# Patient Record
Sex: FEMALE | Race: OTHER | Employment: UNEMPLOYED | URBAN - METROPOLITAN AREA
[De-identification: names, ages, dates, MRNs, and addresses within clinical notes are randomized per-mention and may not be internally consistent; named-entity substitution may affect disease eponyms.]

---

## 2020-11-11 ENCOUNTER — OFFICE VISIT (OUTPATIENT)
Dept: OBGYN CLINIC | Facility: CLINIC | Age: 25
End: 2020-11-11
Payer: MEDICAID

## 2020-11-11 DIAGNOSIS — O99.280 HYPOTHYROID IN PREGNANCY, ANTEPARTUM: ICD-10-CM

## 2020-11-11 DIAGNOSIS — E03.9 HYPOTHYROID IN PREGNANCY, ANTEPARTUM: ICD-10-CM

## 2020-11-11 DIAGNOSIS — Z71.89 PRENATAL CONSULT: Primary | ICD-10-CM

## 2020-11-11 PROBLEM — E03.8 OTHER SPECIFIED HYPOTHYROIDISM: Status: ACTIVE | Noted: 2020-11-11

## 2020-11-16 ENCOUNTER — APPOINTMENT (OUTPATIENT)
Dept: ULTRASOUND IMAGING | Facility: HOSPITAL | Age: 25
End: 2020-11-16
Attending: EMERGENCY MEDICINE
Payer: MEDICAID

## 2020-11-16 ENCOUNTER — APPOINTMENT (OUTPATIENT)
Dept: MRI IMAGING | Facility: HOSPITAL | Age: 25
End: 2020-11-16
Attending: EMERGENCY MEDICINE
Payer: MEDICAID

## 2020-11-16 ENCOUNTER — HOSPITAL ENCOUNTER (EMERGENCY)
Facility: HOSPITAL | Age: 25
Discharge: HOME | End: 2020-11-16
Attending: EMERGENCY MEDICINE
Payer: MEDICAID

## 2020-11-16 ENCOUNTER — HOSPITAL ENCOUNTER (OUTPATIENT)
Facility: HOSPITAL | Age: 25
Setting detail: OBSERVATION
Discharge: HOME | End: 2020-11-16
Attending: OBSTETRICS & GYNECOLOGY | Admitting: OBSTETRICS & GYNECOLOGY
Payer: MEDICAID

## 2020-11-16 VITALS
DIASTOLIC BLOOD PRESSURE: 64 MMHG | SYSTOLIC BLOOD PRESSURE: 102 MMHG | OXYGEN SATURATION: 98 % | TEMPERATURE: 99 F | RESPIRATION RATE: 18 BRPM | HEART RATE: 104 BPM | WEIGHT: 123 LBS

## 2020-11-16 VITALS
RESPIRATION RATE: 18 BRPM | HEART RATE: 121 BPM | TEMPERATURE: 99 F | DIASTOLIC BLOOD PRESSURE: 76 MMHG | SYSTOLIC BLOOD PRESSURE: 118 MMHG

## 2020-11-16 DIAGNOSIS — N28.9 KIDNEY LESION, NATIVE, RIGHT: ICD-10-CM

## 2020-11-16 DIAGNOSIS — R10.9 RIGHT FLANK PAIN: Primary | ICD-10-CM

## 2020-11-16 DIAGNOSIS — M51.24 BULGING OF THORACIC INTERVERTEBRAL DISC: ICD-10-CM

## 2020-11-16 PROBLEM — Z34.90 PREGNANCY: Status: ACTIVE | Noted: 2020-11-16

## 2020-11-16 PROBLEM — Z34.90 PREGNANCY (HCC): Status: ACTIVE | Noted: 2020-11-16

## 2020-11-16 PROCEDURE — 72195 MRI PELVIS W/O DYE: CPT | Performed by: EMERGENCY MEDICINE

## 2020-11-16 PROCEDURE — 36415 COLL VENOUS BLD VENIPUNCTURE: CPT

## 2020-11-16 PROCEDURE — 83690 ASSAY OF LIPASE: CPT | Performed by: EMERGENCY MEDICINE

## 2020-11-16 PROCEDURE — 80048 BASIC METABOLIC PNL TOTAL CA: CPT | Performed by: EMERGENCY MEDICINE

## 2020-11-16 PROCEDURE — 81001 URINALYSIS AUTO W/SCOPE: CPT | Performed by: EMERGENCY MEDICINE

## 2020-11-16 PROCEDURE — 76770 US EXAM ABDO BACK WALL COMP: CPT | Performed by: EMERGENCY MEDICINE

## 2020-11-16 PROCEDURE — 76705 ECHO EXAM OF ABDOMEN: CPT | Performed by: EMERGENCY MEDICINE

## 2020-11-16 PROCEDURE — 99213 OFFICE O/P EST LOW 20 MIN: CPT | Performed by: OBSTETRICS & GYNECOLOGY

## 2020-11-16 PROCEDURE — 85025 COMPLETE CBC W/AUTO DIFF WBC: CPT | Performed by: EMERGENCY MEDICINE

## 2020-11-16 PROCEDURE — 87086 URINE CULTURE/COLONY COUNT: CPT | Performed by: EMERGENCY MEDICINE

## 2020-11-16 PROCEDURE — 99285 EMERGENCY DEPT VISIT HI MDM: CPT

## 2020-11-16 PROCEDURE — 87186 SC STD MICRODIL/AGAR DIL: CPT | Performed by: EMERGENCY MEDICINE

## 2020-11-16 PROCEDURE — 87088 URINE BACTERIA CULTURE: CPT | Performed by: EMERGENCY MEDICINE

## 2020-11-16 PROCEDURE — 80076 HEPATIC FUNCTION PANEL: CPT | Performed by: EMERGENCY MEDICINE

## 2020-11-16 PROCEDURE — 81025 URINE PREGNANCY TEST: CPT

## 2020-11-16 RX ORDER — GLYCERIN/MINERAL OIL
LOTION (ML) TOPICAL
COMMUNITY

## 2020-11-16 RX ORDER — LEVOTHYROXINE SODIUM 0.03 MG/1
25 TABLET ORAL
COMMUNITY
End: 2020-12-09

## 2020-11-16 NOTE — ED NOTES
Pt states right-sided abd/flank pain since yesterday at 1430- no trauma. First pregnancy. No bleeding.

## 2020-11-16 NOTE — ED PROVIDER NOTES
Patient signed out to me pending MRI, no evidence of appendicitis or renal lesion as seen on ultrasound. Patient does have bulging disc at T11-12 which could explain her right flank pain. Will refer to physiatry.   Fetal heart tones normal no tenderness o

## 2020-11-16 NOTE — ED PROVIDER NOTES
Patient Seen in: [facility] AND [facility] Emergency Department      History   Patient presents with:  Abdomen/Flank Pain  Pregnancy Issues    Stated Complaint: Right sided flank pain     HPI    25 y/o female approximately 24 weeks pregnant now with her first pr pulses. Pulmonary/Chest: Effort normal. No respiratory distress. Abdominal: Soft. There is no tenderness. There is no guarding. No significant pain with palpation of the right flank or right upper quadrant.   No significant pain on palpation of the ri CBC W/ DIFFERENTIAL[[id number]]          Abnormal            Final result                 Please view results for these tests on the individual orders.    RAINBOW DRAW BLUE   RAINBOW DRAW LAVENDER   RAINBOW DRAW LIGHT GREEN   RAINBOW DRAW GOLD   URINE CULT collecting system but no juan hydronephrosis. Normal echogenicity. No shadowing stone. Of note, there is a questionable poorly defined mass versus artifact  in the upper pole of the right kidney which measures 2.8 x 2.8 x 1.9 cm.   Additional imaging is no discharge medications for this patient.

## 2020-11-17 NOTE — PROGRESS NOTES
OB Laborist On Call   Obstetrics and Gynecology    OB/GYN: Triage Progress Note       Subjective: Krishna Melendez is a 26year old  female at 18w0d Estimated Date of Delivery: 3/8/21 who is being seen in triage with complaint of right flank pain.  The There is no significant bilateral hydronephrosis. Ureters are not well seen, suggesting absence of hydroureter. ADRENALS:      No nodule or enlargement. AORTA/VASCULAR:      No aortic aneurysm. No periaortic inflammation.   RETROPERITONEUM:  No inf GALLBLADDER:            Normal size without evidence of intraluminal calculi, wall thickening, or pericholecystic fluid. According to the technologist performing the study, the sonographic Selvin Null sign was not elicited.   BILE DUCTS:    Negative for dilata excluded. See   above. \"     Labs:   Results for Anette Taylor (MRN [id number]) as of 11/16/2020 21:01   Ref.  Range 11/16/2020 11:20   Glucose Latest Ref Range: 70 - 99 mg/dL 80   Sodium Latest Ref Range: 136 - 145 mmol/L 139   Potassium Latest Ref Range Absolute Latest Ref Range: 0.10 - 1.00 x10(3) uL 0.65   Eosinophils Absolute Latest Ref Range: 0.00 - 0.70 x10(3) uL 0.02   Basophils Absolute Latest Ref Range: 0.00 - 0.20 x10(3) uL 0.03   Immature Granulocyte Absolute Latest Ref Range: 0.00 - 1.00 x10(3) evidence of kidney stone, or pyelonephritis. Pt understood and agreed. - Reviewed with patient that she may use tylenol as needed for pain but if her pain is not improving or she develops fevers or chills to come back for evaluation. Pt understood.    - P

## 2020-11-17 NOTE — TRIAGE
[facility]      Triage Note    Marlee Johns Patient Status:  Observation    10/22/1995 MRN [id number]   Location [address]  Attending Griselda Andrews MD   Hosp Day # 0 PCP PHYSICIAN NONSTAFF        Para: Additional Comments       Patient presents with:   Complication: pt c/o constant right sided upper abdomen/flank pain.   Pt denied S/S of current UTI but states she had UTI 1.5 months ago and has been taking the antibiotics on and off as nee

## 2020-11-19 ENCOUNTER — NURSE ONLY (OUTPATIENT)
Dept: OBGYN CLINIC | Facility: CLINIC | Age: 25
End: 2020-11-19
Payer: MEDICAID

## 2020-11-19 VITALS — WEIGHT: 123 LBS | BODY MASS INDEX: 24 KG/M2

## 2020-11-19 RX ORDER — CEPHALEXIN 500 MG/1
CAPSULE ORAL
COMMUNITY
Start: 2020-10-26 | End: 2020-11-25 | Stop reason: ALTCHOICE

## 2020-11-19 NOTE — PROGRESS NOTES
Nurse education complete via phone visit. Pt instructed to review folder & handouts. Pt hx anxiety & hypothyroid. Pt is DENYS, records reviewed, lab results entered & verified. Last pap 10/26 & normal. Pt is being seen by a therapist for anxiety.  Was in ED f

## 2020-11-25 ENCOUNTER — OFFICE VISIT (OUTPATIENT)
Dept: NEUROLOGY | Facility: CLINIC | Age: 25
End: 2020-11-25
Payer: MEDICAID

## 2020-11-25 ENCOUNTER — INITIAL PRENATAL (OUTPATIENT)
Dept: OBGYN CLINIC | Facility: CLINIC | Age: 25
End: 2020-11-25
Payer: MEDICAID

## 2020-11-25 VITALS
BODY MASS INDEX: 25 KG/M2 | WEIGHT: 127 LBS | HEART RATE: 106 BPM | DIASTOLIC BLOOD PRESSURE: 73 MMHG | SYSTOLIC BLOOD PRESSURE: 108 MMHG

## 2020-11-25 VITALS — WEIGHT: 125 LBS | BODY MASS INDEX: 24.54 KG/M2 | HEIGHT: 60 IN

## 2020-11-25 DIAGNOSIS — Z34.90 PREGNANCY, UNSPECIFIED GESTATIONAL AGE: ICD-10-CM

## 2020-11-25 DIAGNOSIS — M54.16 ACUTE RIGHT LUMBAR RADICULOPATHY: Primary | ICD-10-CM

## 2020-11-25 DIAGNOSIS — Z34.02 ENCOUNTER FOR SUPERVISION OF NORMAL FIRST PREGNANCY IN SECOND TRIMESTER: Primary | ICD-10-CM

## 2020-11-25 PROBLEM — N39.0 UTI (URINARY TRACT INFECTION): Status: ACTIVE | Noted: 2020-11-25

## 2020-11-25 PROCEDURE — 0502F SUBSEQUENT PRENATAL CARE: CPT | Performed by: ADVANCED PRACTICE MIDWIFE

## 2020-11-25 PROCEDURE — 3074F SYST BP LT 130 MM HG: CPT | Performed by: ADVANCED PRACTICE MIDWIFE

## 2020-11-25 PROCEDURE — 3008F BODY MASS INDEX DOCD: CPT | Performed by: PHYSICAL MEDICINE & REHABILITATION

## 2020-11-25 PROCEDURE — 3078F DIAST BP <80 MM HG: CPT | Performed by: ADVANCED PRACTICE MIDWIFE

## 2020-11-25 PROCEDURE — 1111F DSCHRG MED/CURRENT MED MERGE: CPT | Performed by: PHYSICAL MEDICINE & REHABILITATION

## 2020-11-25 PROCEDURE — 81002 URINALYSIS NONAUTO W/O SCOPE: CPT | Performed by: ADVANCED PRACTICE MIDWIFE

## 2020-11-25 PROCEDURE — 99204 OFFICE O/P NEW MOD 45 MIN: CPT | Performed by: PHYSICAL MEDICINE & REHABILITATION

## 2020-11-25 RX ORDER — CEPHALEXIN 500 MG/1
500 CAPSULE ORAL 2 TIMES DAILY
Qty: 20 CAPSULE | Refills: 0 | Status: SHIPPED
Start: 2020-11-25 | End: 2020-12-05

## 2020-11-25 NOTE — PROGRESS NOTES
Pt was seen in ER and OB triage on 11/16. UTO e-coli pt stopped antibiotics because she thought they were no lnoger needed. Will, restart abx for 10 day course  Will send urine for culture today. Active fetus  No signs signs of PTL.   Reviewed S&S of PTL Co

## 2020-11-25 NOTE — PROGRESS NOTES
[address]  NEW PATIENT EVALUATION    Chief Complaint: flank pain. HISTORY OF PRESENT ILLNESS:   Patient presents with:  Back Pain: New right handed patient.  Patient went to the ER after having right side PAST SURGICAL HISTORY:   History reviewed. No pertinent surgical history.       CURRENT MEDICATIONS:     Current Outpatient Medications   Medication Sig Dispense Refill   • Prenatal Vit-Fe Fumarate-FA ( PRENATAL VITAMINS) 28-0.8 MG Oral Tab Take b intact. Ears: No auricular hematoma or deformities  Mouth: No lesions or ulcerations  Heart: peripheral pulses intact. Normal capillary refill.    Lungs: Non-labored respirations  Abdomen: No abdominal guarding  Extremities: No lower extremity edema bilat 11/16/2020     No results found for: PTP, PT, INR  No results found for: VITD, QVITD, VITD25, NEEW89CI    IMAGING:     MRI APPENDIX dated November 16, 2020 revealed:   CONCLUSION:   1. Normal retrocecal appendix.   2.  Right paracentral disc protrusion at addressed and there were no barriers to learning. Ángel BERNAL. [address] [facility]  Physical Medicine and Rehabilitation/Sports Medicine

## 2020-11-30 ENCOUNTER — OFFICE VISIT (OUTPATIENT)
Dept: ENDOCRINOLOGY CLINIC | Facility: CLINIC | Age: 25
End: 2020-11-30
Payer: MEDICAID

## 2020-11-30 ENCOUNTER — LAB ENCOUNTER (OUTPATIENT)
Dept: LAB | Age: 25
End: 2020-11-30
Attending: INTERNAL MEDICINE
Payer: MEDICAID

## 2020-11-30 VITALS — HEART RATE: 97 BPM | SYSTOLIC BLOOD PRESSURE: 123 MMHG | DIASTOLIC BLOOD PRESSURE: 72 MMHG

## 2020-11-30 DIAGNOSIS — E03.9 HYPOTHYROIDISM, UNSPECIFIED TYPE: Primary | ICD-10-CM

## 2020-11-30 DIAGNOSIS — E03.9 HYPOTHYROIDISM, UNSPECIFIED TYPE: ICD-10-CM

## 2020-11-30 PROCEDURE — 86800 THYROGLOBULIN ANTIBODY: CPT

## 2020-11-30 PROCEDURE — 3078F DIAST BP <80 MM HG: CPT | Performed by: INTERNAL MEDICINE

## 2020-11-30 PROCEDURE — 84439 ASSAY OF FREE THYROXINE: CPT

## 2020-11-30 PROCEDURE — 99243 OFF/OP CNSLTJ NEW/EST LOW 30: CPT | Performed by: INTERNAL MEDICINE

## 2020-11-30 PROCEDURE — 3074F SYST BP LT 130 MM HG: CPT | Performed by: INTERNAL MEDICINE

## 2020-11-30 PROCEDURE — 84443 ASSAY THYROID STIM HORMONE: CPT

## 2020-11-30 PROCEDURE — 86376 MICROSOMAL ANTIBODY EACH: CPT

## 2020-11-30 PROCEDURE — 36415 COLL VENOUS BLD VENIPUNCTURE: CPT

## 2020-11-30 NOTE — H&P
Name: Terese Vasquez  Date: 11/30/2020    Referring Physician:  Javan Richard    Patient presents with:  Consult: thyroid -25mcg levothyroxine; no symptoms now; 26 weeks pregnant      HISTORY OF PRESENT ILLNESS   Terese Vasquez is a 26 year o chest pain or palpitations  Gastrointestinal:  no abdominal pain, bowel movement problems  Musculoskeletal: no muscle pain or arthralgia  /Gyne: no frequency or discomfort while urinating  Psychiatric:  no acute distress, anxiety  or depression  Skin: no development and well being of the fetus. Maternal complications such as miscarriages, anaemia in pregnancy, pre-eclampsia, abruptio placenta and postpartum haemorrhage can occur in pregnant women with overt hypothyroidism.  Also, the offspring of these mot

## 2020-12-01 ENCOUNTER — TELEPHONE (OUTPATIENT)
Dept: ENDOCRINOLOGY CLINIC | Facility: CLINIC | Age: 25
End: 2020-12-01

## 2020-12-01 NOTE — TELEPHONE ENCOUNTER
Hi!  Please call this patient and let her know that I have reviewed her test results and that the cause for her hypothyroidism is Hashimoto thyroiditis, or Autoimmune thyroid disease.      Also, her TSH and free T4 are normal, so I would like her to stay on

## 2020-12-07 ENCOUNTER — LAB ENCOUNTER (OUTPATIENT)
Dept: LAB | Age: 25
End: 2020-12-07
Attending: ADVANCED PRACTICE MIDWIFE
Payer: MEDICAID

## 2020-12-07 DIAGNOSIS — Z34.02 ENCOUNTER FOR SUPERVISION OF NORMAL FIRST PREGNANCY IN SECOND TRIMESTER: ICD-10-CM

## 2020-12-07 PROCEDURE — 36415 COLL VENOUS BLD VENIPUNCTURE: CPT

## 2020-12-07 PROCEDURE — 86780 TREPONEMA PALLIDUM: CPT

## 2020-12-07 PROCEDURE — 87389 HIV-1 AG W/HIV-1&-2 AB AG IA: CPT

## 2020-12-07 PROCEDURE — 82950 GLUCOSE TEST: CPT

## 2020-12-07 PROCEDURE — 85027 COMPLETE CBC AUTOMATED: CPT

## 2020-12-08 ENCOUNTER — PATIENT MESSAGE (OUTPATIENT)
Dept: ENDOCRINOLOGY CLINIC | Facility: CLINIC | Age: 25
End: 2020-12-08

## 2020-12-08 ENCOUNTER — TELEPHONE (OUTPATIENT)
Dept: OBGYN CLINIC | Facility: CLINIC | Age: 25
End: 2020-12-08

## 2020-12-08 NOTE — TELEPHONE ENCOUNTER
----- Message from Trang oMrrow CNM sent at 12/7/2020  8:11 PM CST -----  Please call HIV negative all other PN labs are wnl for pregnancy

## 2020-12-09 RX ORDER — LEVOTHYROXINE SODIUM 0.03 MG/1
25 TABLET ORAL
Qty: 30 TABLET | Refills: 4 | Status: SHIPPED | OUTPATIENT
Start: 2020-12-09 | End: 2021-04-14

## 2020-12-09 NOTE — TELEPHONE ENCOUNTER
Dr. Venu Sapp reviewed labs from 12/1 and sent patient note \"I have reviewed your test results. Your Anti-TPO antibodies are positives, which means that the cause for your underactive thyroid is Hashimoto's Thyroiditis.  Your TSH and your Free T4 levels a

## 2020-12-09 NOTE — TELEPHONE ENCOUNTER
From: Kylie Blount  To: Joy Jackson MD  Sent: 12/8/2020 1:19 PM CST  Subject: Referral Request    Good afternoon Dr. Fabio Agosto,  This is Lucendia Hurt came in last Monday. I am currently out of my medication Levothyroxine 0.025MG.  I am

## 2020-12-22 ENCOUNTER — OFFICE VISIT (OUTPATIENT)
Dept: NEUROLOGY | Facility: CLINIC | Age: 25
End: 2020-12-22
Payer: MEDICAID

## 2020-12-22 ENCOUNTER — TELEPHONE (OUTPATIENT)
Dept: ENDOCRINOLOGY CLINIC | Facility: CLINIC | Age: 25
End: 2020-12-22

## 2020-12-22 ENCOUNTER — TELEPHONE (OUTPATIENT)
Dept: NEUROLOGY | Facility: CLINIC | Age: 25
End: 2020-12-22

## 2020-12-22 VITALS — BODY MASS INDEX: 25.32 KG/M2 | WEIGHT: 129 LBS | HEIGHT: 60 IN

## 2020-12-22 DIAGNOSIS — M79.10 MYALGIA: ICD-10-CM

## 2020-12-22 DIAGNOSIS — Z34.90 PREGNANCY, UNSPECIFIED GESTATIONAL AGE: ICD-10-CM

## 2020-12-22 DIAGNOSIS — M54.16 ACUTE RIGHT LUMBAR RADICULOPATHY: ICD-10-CM

## 2020-12-22 DIAGNOSIS — M53.3 SACROILIAC JOINT DYSFUNCTION OF RIGHT SIDE: Primary | ICD-10-CM

## 2020-12-22 PROCEDURE — 3008F BODY MASS INDEX DOCD: CPT | Performed by: PHYSICAL MEDICINE & REHABILITATION

## 2020-12-22 PROCEDURE — 99214 OFFICE O/P EST MOD 30 MIN: CPT | Performed by: PHYSICAL MEDICINE & REHABILITATION

## 2020-12-22 NOTE — PATIENT INSTRUCTIONS
-Please wear a SI belt  -My office will call when injection is approved  -Start PT and home exercises  -Ice/Heat as tolerated

## 2020-12-22 NOTE — TELEPHONE ENCOUNTER
Colleen Rojas at [facility]- Case/Reference # [id number] to initiate authorization for Ultrasound guided Right SI joint injection with corticosteroid CPT [id number] dx:M53.3+M79.10 to be done in office. (requested 3 visits over 6 months)

## 2020-12-22 NOTE — TELEPHONE ENCOUNTER
Please refer to TE 12/1/20. Spoke with Do Brown MD message. Scheduled appointment on 2/3/21, and will have thyroid labs rechecked before her appointment. No further questions at this time. Lab already ordered.

## 2020-12-23 PROBLEM — M53.3 SACROILIAC JOINT DYSFUNCTION OF RIGHT SIDE: Status: ACTIVE | Noted: 2020-12-23

## 2020-12-23 NOTE — PROGRESS NOTES
[address]  FOLLOW UP EVALUATION    Chief Complaint: flank pain.     HISTORY OF PRESENT ILLNESS:   Patient presents with:  Low Back Pain: LOV: 11/25/2020 pain started two months ago and went to the ER. pain i She denies any saddle anesthesia. Her pain is intermittently present. She cannot identify any specific exacerbating factors as she thought she had some back pain related to her pregnancy.   She occasionally gets pain in the groins but she thinks this is PHYSICAL EXAM:   Ht 60\"   Wt 129 lb (58.5 kg)   LMP 06/01/2020 (Exact Date)   BMI 25.19 kg/m²   General: No immediate distress  Head: Normocephalic/ Atraumatic  Eyes: Extra-occular movements intact.    Ears: No auricular hematoma or deformities  Mout 11/16/2020    ALKPHO 76 11/16/2020    AST 16 11/16/2020    ALT 15 11/16/2020    BILT 0.3 11/16/2020    TP 7.3 11/16/2020    ALB 2.8 (L) 11/16/2020     11/16/2020    K 3.8 11/16/2020     11/16/2020    CO2 27.0 11/16/2020     No results found for

## 2020-12-29 NOTE — TELEPHONE ENCOUNTER
[address] to check on status of Ultrasound guided Right SI joint injection with corticosteroid. Case/Reference # [id number]. T/t [place] [employer] Authorization # [id number] for 3 units/DOS effective 12/22/20 to 06/22/21.  Will inform Nursi

## 2021-01-04 ENCOUNTER — ROUTINE PRENATAL (OUTPATIENT)
Dept: OBGYN CLINIC | Facility: CLINIC | Age: 26
End: 2021-01-04
Payer: MEDICAID

## 2021-01-04 VITALS
SYSTOLIC BLOOD PRESSURE: 107 MMHG | BODY MASS INDEX: 26.31 KG/M2 | WEIGHT: 134 LBS | HEIGHT: 60 IN | HEART RATE: 114 BPM | DIASTOLIC BLOOD PRESSURE: 73 MMHG

## 2021-01-04 DIAGNOSIS — F41.9 ANXIETY: ICD-10-CM

## 2021-01-04 DIAGNOSIS — Z34.03 ENCOUNTER FOR SUPERVISION OF NORMAL FIRST PREGNANCY IN THIRD TRIMESTER: Primary | ICD-10-CM

## 2021-01-04 LAB
MULTISTIX LOT#: 5077 NUMERIC
NITRITE, URINE: POSITIVE
PH, URINE: 6.5 (ref 4.5–8)
SPECIFIC GRAVITY: 1.02 (ref 1–1.03)
URINE-COLOR: YELLOW
UROBILINOGEN,SEMI-QN: 0.2 MG/DL (ref 0–1.9)

## 2021-01-04 PROCEDURE — 3008F BODY MASS INDEX DOCD: CPT | Performed by: ADVANCED PRACTICE MIDWIFE

## 2021-01-04 PROCEDURE — 3074F SYST BP LT 130 MM HG: CPT | Performed by: ADVANCED PRACTICE MIDWIFE

## 2021-01-04 PROCEDURE — 90471 IMMUNIZATION ADMIN: CPT | Performed by: ADVANCED PRACTICE MIDWIFE

## 2021-01-04 PROCEDURE — 90715 TDAP VACCINE 7 YRS/> IM: CPT | Performed by: ADVANCED PRACTICE MIDWIFE

## 2021-01-04 PROCEDURE — 3078F DIAST BP <80 MM HG: CPT | Performed by: ADVANCED PRACTICE MIDWIFE

## 2021-01-04 PROCEDURE — 81002 URINALYSIS NONAUTO W/O SCOPE: CPT | Performed by: ADVANCED PRACTICE MIDWIFE

## 2021-01-04 PROCEDURE — 0502F SUBSEQUENT PRENATAL CARE: CPT | Performed by: ADVANCED PRACTICE MIDWIFE

## 2021-01-04 NOTE — PROGRESS NOTES
Ms. Ginger Meade is feeling well overall, denies FVB, LOF, and any danger signs. Reports fetal movement appropriate for gestational age. Continues to have some right-sided back pain and some anterior pelvic pain.  Intermittent, due to weight/stress o

## 2021-01-05 LAB
C TRACH DNA SPEC QL NAA+PROBE: NEGATIVE
N GONORRHOEA DNA SPEC QL NAA+PROBE: NEGATIVE

## 2021-01-06 ENCOUNTER — OFFICE VISIT (OUTPATIENT)
Dept: PHYSICAL THERAPY | Age: 26
End: 2021-01-06
Attending: PHYSICAL MEDICINE & REHABILITATION
Payer: MEDICAID

## 2021-01-06 ENCOUNTER — TELEPHONE (OUTPATIENT)
Dept: OBGYN CLINIC | Facility: CLINIC | Age: 26
End: 2021-01-06

## 2021-01-06 DIAGNOSIS — M54.16 ACUTE RIGHT LUMBAR RADICULOPATHY: ICD-10-CM

## 2021-01-06 PROCEDURE — 97161 PT EVAL LOW COMPLEX 20 MIN: CPT | Performed by: PHYSICAL THERAPIST

## 2021-01-06 PROCEDURE — 97110 THERAPEUTIC EXERCISES: CPT | Performed by: PHYSICAL THERAPIST

## 2021-01-06 RX ORDER — NITROFURANTOIN 25; 75 MG/1; MG/1
100 CAPSULE ORAL 2 TIMES DAILY
Qty: 14 CAPSULE | Refills: 0 | Status: SHIPPED | OUTPATIENT
Start: 2021-01-06 | End: 2021-01-13

## 2021-01-06 NOTE — TELEPHONE ENCOUNTER
----- Message from Leonardo Contreras CNM sent at 1/6/2021 10:36 AM CST -----  Positive urine culture - rx sent to pharmacy.

## 2021-01-06 NOTE — PROGRESS NOTES
P.T. EVALUATION:   Referring Physician: Dr. Lanier Blood  Diagnosis: Acute right lumbar radiculopathy (M54.16)    Date of Onset: 11/25/2020 Date of Service: 1/6/2021     PATIENT SUMMARY   Patient verbalized consent for Physical Therapy treatment.   [employer] and tx due to back pain. Patient presents with the following limitations: decreased tolerance to prolonged sitting and standing. Difficulty bending, lifting, walking. Patient states she has limited tolerance to household activities.   Fabienne Botello would benefit f for 2 x/week or a total of 10 visits over a 90 day period and as patient's schedule allows.  Treatment will include: Modalities prn, manual therapy, therapeutic exercises, therapeutic activity, and instructions on a home program.    Education or treatment l

## 2021-01-08 ENCOUNTER — OFFICE VISIT (OUTPATIENT)
Dept: PHYSICAL THERAPY | Age: 26
End: 2021-01-08
Attending: PHYSICAL MEDICINE & REHABILITATION
Payer: MEDICAID

## 2021-01-08 PROCEDURE — 97110 THERAPEUTIC EXERCISES: CPT | Performed by: PHYSICAL THERAPIST

## 2021-01-08 NOTE — PROGRESS NOTES
Diagnosis: Acute right lumbar radiculopathy (M54.16)         Next MD visit: none scheduled  Fall Risk: standard         Precautions: n/a          Medication Changes since last visit?: No      Subjective: States her back was sore yesterday and felt better

## 2021-01-11 ENCOUNTER — OFFICE VISIT (OUTPATIENT)
Dept: PHYSICAL THERAPY | Age: 26
End: 2021-01-11
Attending: PHYSICAL MEDICINE & REHABILITATION
Payer: MEDICAID

## 2021-01-11 PROCEDURE — 97110 THERAPEUTIC EXERCISES: CPT | Performed by: PHYSICAL THERAPIST

## 2021-01-11 NOTE — PROGRESS NOTES
Diagnosis: Acute right lumbar radiculopathy (M54.16)         Next MD visit: none scheduled  Fall Risk: standard         Precautions: n/a          Medication Changes since last visit?: No      Subjective: States her back felt better after last therapy ses during activity. Plan: Continue progression of therapeutic exercises to decrease pain, improve trunk mobility, and tolerance to activities.     Charges: EX3       Total Timed Treatment: 40 min  Total Treatment Time: 40 min

## 2021-01-13 ENCOUNTER — APPOINTMENT (OUTPATIENT)
Dept: PHYSICAL THERAPY | Age: 26
End: 2021-01-13
Attending: PHYSICAL MEDICINE & REHABILITATION
Payer: MEDICAID

## 2021-01-13 ENCOUNTER — TELEPHONE (OUTPATIENT)
Dept: PHYSICAL THERAPY | Facility: HOSPITAL | Age: 26
End: 2021-01-13

## 2021-01-18 ENCOUNTER — ROUTINE PRENATAL (OUTPATIENT)
Dept: OBGYN CLINIC | Facility: CLINIC | Age: 26
End: 2021-01-18
Payer: MEDICAID

## 2021-01-18 ENCOUNTER — OFFICE VISIT (OUTPATIENT)
Dept: PHYSICAL THERAPY | Age: 26
End: 2021-01-18
Attending: PHYSICAL MEDICINE & REHABILITATION
Payer: MEDICAID

## 2021-01-18 VITALS
HEART RATE: 88 BPM | DIASTOLIC BLOOD PRESSURE: 79 MMHG | SYSTOLIC BLOOD PRESSURE: 111 MMHG | HEIGHT: 60 IN | BODY MASS INDEX: 26.95 KG/M2 | WEIGHT: 137.25 LBS

## 2021-01-18 DIAGNOSIS — Z34.03 ENCOUNTER FOR SUPERVISION OF NORMAL FIRST PREGNANCY IN THIRD TRIMESTER: Primary | ICD-10-CM

## 2021-01-18 LAB
MULTISTIX LOT#: 5077 NUMERIC
NITRITE, URINE: POSITIVE
PH, URINE: 7.5 (ref 4.5–8)
SPECIFIC GRAVITY: 1.01 (ref 1–1.03)
URINE-COLOR: YELLOW
UROBILINOGEN,SEMI-QN: 0.2 MG/DL (ref 0–1.9)

## 2021-01-18 PROCEDURE — 97110 THERAPEUTIC EXERCISES: CPT | Performed by: PHYSICAL THERAPIST

## 2021-01-18 PROCEDURE — 3008F BODY MASS INDEX DOCD: CPT | Performed by: ADVANCED PRACTICE MIDWIFE

## 2021-01-18 PROCEDURE — 3078F DIAST BP <80 MM HG: CPT | Performed by: ADVANCED PRACTICE MIDWIFE

## 2021-01-18 PROCEDURE — 81002 URINALYSIS NONAUTO W/O SCOPE: CPT | Performed by: ADVANCED PRACTICE MIDWIFE

## 2021-01-18 PROCEDURE — 0502F SUBSEQUENT PRENATAL CARE: CPT | Performed by: ADVANCED PRACTICE MIDWIFE

## 2021-01-18 PROCEDURE — 3074F SYST BP LT 130 MM HG: CPT | Performed by: ADVANCED PRACTICE MIDWIFE

## 2021-01-18 NOTE — PROGRESS NOTES
Discussed comfort measures for back pain. Has started PT. Did not take antibiotics due to moving, picked up over the weekend and will start today. Reviewed danger signs. Information on  Childbirth ed given.

## 2021-01-18 NOTE — PROGRESS NOTES
Diagnosis: Acute right lumbar radiculopathy (M54.16)         Next MD visit: none scheduled  Fall Risk: standard         Precautions: n/a          Medication Changes since last visit?: No      Subjective: States her back is feeling better.  Complains of pe and knee flexion  - sidelying hip external rotation with yellow theraband 10 x 2                         Assessment: Continued progression of therapeutic exercises. Demonstrates good tolerance to core strengthening and trunk stabilization exercises.  No inc

## 2021-01-20 ENCOUNTER — TELEPHONE (OUTPATIENT)
Dept: PHYSICAL THERAPY | Facility: HOSPITAL | Age: 26
End: 2021-01-20

## 2021-01-20 ENCOUNTER — APPOINTMENT (OUTPATIENT)
Dept: PHYSICAL THERAPY | Age: 26
End: 2021-01-20
Attending: PHYSICAL MEDICINE & REHABILITATION
Payer: MEDICAID

## 2021-01-25 ENCOUNTER — OFFICE VISIT (OUTPATIENT)
Dept: PHYSICAL THERAPY | Age: 26
End: 2021-01-25
Attending: PHYSICAL MEDICINE & REHABILITATION
Payer: MEDICAID

## 2021-01-25 PROCEDURE — 97110 THERAPEUTIC EXERCISES: CPT | Performed by: PHYSICAL THERAPIST

## 2021-01-25 NOTE — PROGRESS NOTES
Diagnosis: Acute right lumbar radiculopathy (M54.16)         Next MD visit: none scheduled  Fall Risk: standard         Precautions: n/a          Medication Changes since last visit?: No      Subjective: States her back is feeling better.  Still complains 2                       Assessment: Patient is currently 8 months pregnant. Still complains of pelvic floor discomfort. Patient was able to do above exercises without increase in pain. Trunk mobility is WFL. Patient and PT goals are in progress.     Goals

## 2021-01-27 ENCOUNTER — APPOINTMENT (OUTPATIENT)
Dept: PHYSICAL THERAPY | Age: 26
End: 2021-01-27
Attending: PHYSICAL MEDICINE & REHABILITATION
Payer: MEDICAID

## 2021-01-28 ENCOUNTER — APPOINTMENT (OUTPATIENT)
Dept: PHYSICAL THERAPY | Age: 26
End: 2021-01-28
Attending: PHYSICAL MEDICINE & REHABILITATION
Payer: MEDICAID

## 2021-02-02 ENCOUNTER — TELEPHONE (OUTPATIENT)
Dept: OBGYN CLINIC | Facility: CLINIC | Age: 26
End: 2021-02-02

## 2021-02-02 ENCOUNTER — APPOINTMENT (OUTPATIENT)
Dept: PHYSICAL THERAPY | Age: 26
End: 2021-02-02
Attending: PHYSICAL MEDICINE & REHABILITATION
Payer: MEDICAID

## 2021-02-02 NOTE — TELEPHONE ENCOUNTER
Phone rings and goes busy. Pt missed her PN appt with Cecelia Barksdale on 2/1/21. Pt needs to r/s appt.

## 2021-02-08 ENCOUNTER — ROUTINE PRENATAL (OUTPATIENT)
Dept: OBGYN CLINIC | Facility: CLINIC | Age: 26
End: 2021-02-08
Payer: MEDICAID

## 2021-02-08 VITALS
DIASTOLIC BLOOD PRESSURE: 82 MMHG | BODY MASS INDEX: 28 KG/M2 | WEIGHT: 142.63 LBS | HEART RATE: 98 BPM | SYSTOLIC BLOOD PRESSURE: 119 MMHG

## 2021-02-08 DIAGNOSIS — N89.8 VAGINAL LEUKORRHEA: ICD-10-CM

## 2021-02-08 DIAGNOSIS — Z34.03 ENCOUNTER FOR SUPERVISION OF NORMAL FIRST PREGNANCY IN THIRD TRIMESTER: Primary | ICD-10-CM

## 2021-02-08 DIAGNOSIS — Z87.440 HISTORY OF URINARY TRACT INFECTION: ICD-10-CM

## 2021-02-08 LAB
MULTISTIX LOT#: 5077 NUMERIC
PH, URINE: 6 (ref 4.5–8)
SPECIFIC GRAVITY: 1.02 (ref 1–1.03)
URINE-COLOR: YELLOW
UROBILINOGEN,SEMI-QN: 0.2 MG/DL (ref 0–1.9)

## 2021-02-08 PROCEDURE — 0502F SUBSEQUENT PRENATAL CARE: CPT | Performed by: ADVANCED PRACTICE MIDWIFE

## 2021-02-08 PROCEDURE — 3074F SYST BP LT 130 MM HG: CPT | Performed by: ADVANCED PRACTICE MIDWIFE

## 2021-02-08 PROCEDURE — 81002 URINALYSIS NONAUTO W/O SCOPE: CPT | Performed by: ADVANCED PRACTICE MIDWIFE

## 2021-02-08 PROCEDURE — 3079F DIAST BP 80-89 MM HG: CPT | Performed by: ADVANCED PRACTICE MIDWIFE

## 2021-02-08 RX ORDER — BREAST PUMP
EACH MISCELLANEOUS
Qty: 1 EACH | Refills: 0 | Status: ON HOLD
Start: 2021-02-08 | End: 2021-03-05

## 2021-02-08 NOTE — PROGRESS NOTES
Radha Wells is feeling well. Reports increased leaking of white fluid with no odor. Neg pooling, neg fern. Wet mount WNL. Patient states she still feels like she may have a UTI but denies dysuria, frequency or urgency.     Birth plan reviewed:    Support: Kavitha

## 2021-02-09 LAB
C TRACH DNA SPEC QL NAA+PROBE: NEGATIVE
N GONORRHOEA DNA SPEC QL NAA+PROBE: NEGATIVE

## 2021-02-10 ENCOUNTER — TELEPHONE (OUTPATIENT)
Dept: OBGYN CLINIC | Facility: CLINIC | Age: 26
End: 2021-02-10

## 2021-02-10 DIAGNOSIS — N30.00 ACUTE CYSTITIS WITHOUT HEMATURIA: Primary | ICD-10-CM

## 2021-02-10 RX ORDER — CEPHALEXIN 250 MG/1
250 CAPSULE ORAL 4 TIMES DAILY
Qty: 20 CAPSULE | Refills: 0 | Status: SHIPPED | OUTPATIENT
Start: 2021-02-10 | End: 2021-02-15

## 2021-02-10 NOTE — TELEPHONE ENCOUNTER
----- Message from Christopher Alberto CNM sent at 2/10/2021  2:32 PM CST -----  Please call patient to let her know urine culture was positive. Antibiotics have been sent to pharmacy. She should be sure to eat something before taking them.  Thanks, Chuyita Rojas

## 2021-02-16 ENCOUNTER — ROUTINE PRENATAL (OUTPATIENT)
Dept: OBGYN CLINIC | Facility: CLINIC | Age: 26
End: 2021-02-16
Payer: MEDICAID

## 2021-02-16 ENCOUNTER — TELEPHONE (OUTPATIENT)
Dept: OBGYN CLINIC | Facility: CLINIC | Age: 26
End: 2021-02-16

## 2021-02-16 VITALS
WEIGHT: 142.81 LBS | HEART RATE: 86 BPM | BODY MASS INDEX: 28 KG/M2 | SYSTOLIC BLOOD PRESSURE: 125 MMHG | DIASTOLIC BLOOD PRESSURE: 85 MMHG

## 2021-02-16 DIAGNOSIS — E03.9 HYPOTHYROIDISM, UNSPECIFIED TYPE: ICD-10-CM

## 2021-02-16 DIAGNOSIS — Z34.03 ENCOUNTER FOR SUPERVISION OF NORMAL FIRST PREGNANCY IN THIRD TRIMESTER: Primary | ICD-10-CM

## 2021-02-16 PROCEDURE — 3079F DIAST BP 80-89 MM HG: CPT | Performed by: ADVANCED PRACTICE MIDWIFE

## 2021-02-16 PROCEDURE — 81002 URINALYSIS NONAUTO W/O SCOPE: CPT | Performed by: ADVANCED PRACTICE MIDWIFE

## 2021-02-16 PROCEDURE — 0502F SUBSEQUENT PRENATAL CARE: CPT | Performed by: ADVANCED PRACTICE MIDWIFE

## 2021-02-16 PROCEDURE — 3074F SYST BP LT 130 MM HG: CPT | Performed by: ADVANCED PRACTICE MIDWIFE

## 2021-02-16 NOTE — TELEPHONE ENCOUNTER
Please place prior auth for growth ultrasound. Baby measuring small today. She has [facility] and had 20 wk sono elsewhere, so I just placed an order for radiology since our MFM would want full scan and was not sure they would cover it.  Thanks GARCIA

## 2021-02-16 NOTE — PROGRESS NOTES
Baby active, some occasional cramping. Measuring small today, growth ultrasound ordered. SOL and warning signs reviewed. Importance of regular FM reviewed. Taking ABx for UTI. Has had + E coli throughout pregnancy. Denies anal sex.  Wipe front to back, etc.

## 2021-02-17 ENCOUNTER — APPOINTMENT (OUTPATIENT)
Dept: PHYSICAL THERAPY | Age: 26
End: 2021-02-17
Attending: PHYSICAL MEDICINE & REHABILITATION
Payer: MEDICAID

## 2021-02-22 ENCOUNTER — ROUTINE PRENATAL (OUTPATIENT)
Dept: OBGYN CLINIC | Facility: CLINIC | Age: 26
End: 2021-02-22
Payer: MEDICAID

## 2021-02-22 VITALS
SYSTOLIC BLOOD PRESSURE: 131 MMHG | BODY MASS INDEX: 29 KG/M2 | DIASTOLIC BLOOD PRESSURE: 76 MMHG | HEART RATE: 91 BPM | WEIGHT: 146.63 LBS

## 2021-02-22 DIAGNOSIS — Z34.03 ENCOUNTER FOR SUPERVISION OF NORMAL FIRST PREGNANCY IN THIRD TRIMESTER: Primary | ICD-10-CM

## 2021-02-22 PROCEDURE — 81002 URINALYSIS NONAUTO W/O SCOPE: CPT | Performed by: ADVANCED PRACTICE MIDWIFE

## 2021-02-22 PROCEDURE — 3075F SYST BP GE 130 - 139MM HG: CPT | Performed by: ADVANCED PRACTICE MIDWIFE

## 2021-02-22 PROCEDURE — 3078F DIAST BP <80 MM HG: CPT | Performed by: ADVANCED PRACTICE MIDWIFE

## 2021-02-22 PROCEDURE — 0502F SUBSEQUENT PRENATAL CARE: CPT | Performed by: ADVANCED PRACTICE MIDWIFE

## 2021-02-22 NOTE — PROGRESS NOTES
Reports feeling well. Increased pelvic pressure. Active fetus. Denies any leaking of fluid or bleeding. She had growth ultrasound scheduled Saturday. Reviewed S&S labor  Warning signs reviewed. All questions answered.

## 2021-02-23 ENCOUNTER — APPOINTMENT (OUTPATIENT)
Dept: PHYSICAL THERAPY | Age: 26
End: 2021-02-23
Attending: PHYSICAL MEDICINE & REHABILITATION
Payer: MEDICAID

## 2021-02-27 ENCOUNTER — HOSPITAL ENCOUNTER (OUTPATIENT)
Dept: ULTRASOUND IMAGING | Age: 26
Discharge: HOME | End: 2021-02-27
Attending: ADVANCED PRACTICE MIDWIFE
Payer: MEDICAID

## 2021-02-27 DIAGNOSIS — Z34.03 ENCOUNTER FOR SUPERVISION OF NORMAL FIRST PREGNANCY IN THIRD TRIMESTER: ICD-10-CM

## 2021-02-27 PROCEDURE — 76816 OB US FOLLOW-UP PER FETUS: CPT | Performed by: ADVANCED PRACTICE MIDWIFE

## 2021-03-01 ENCOUNTER — ROUTINE PRENATAL (OUTPATIENT)
Dept: OBGYN CLINIC | Facility: CLINIC | Age: 26
End: 2021-03-01
Payer: MEDICAID

## 2021-03-01 ENCOUNTER — HOSPITAL ENCOUNTER (OUTPATIENT)
Facility: HOSPITAL | Age: 26
Setting detail: OBSERVATION
Discharge: HOME | End: 2021-03-01
Attending: ADVANCED PRACTICE MIDWIFE | Admitting: OBSTETRICS & GYNECOLOGY
Payer: MEDICAID

## 2021-03-01 VITALS
DIASTOLIC BLOOD PRESSURE: 84 MMHG | BODY MASS INDEX: 28.66 KG/M2 | HEART RATE: 98 BPM | SYSTOLIC BLOOD PRESSURE: 124 MMHG | WEIGHT: 146 LBS | HEIGHT: 60 IN

## 2021-03-01 VITALS
SYSTOLIC BLOOD PRESSURE: 123 MMHG | HEART RATE: 84 BPM | DIASTOLIC BLOOD PRESSURE: 81 MMHG | TEMPERATURE: 97 F | RESPIRATION RATE: 16 BRPM

## 2021-03-01 DIAGNOSIS — Z34.03 ENCOUNTER FOR SUPERVISION OF NORMAL FIRST PREGNANCY IN THIRD TRIMESTER: Primary | ICD-10-CM

## 2021-03-01 LAB
APPEARANCE: CLEAR
MULTISTIX LOT#: 5077 NUMERIC
PH, URINE: 5 (ref 4.5–8)
SPECIFIC GRAVITY: 1.02 (ref 1–1.03)
URINE-COLOR: YELLOW
UROBILINOGEN,SEMI-QN: 0.2 MG/DL (ref 0–1.9)

## 2021-03-01 PROCEDURE — 0502F SUBSEQUENT PRENATAL CARE: CPT | Performed by: ADVANCED PRACTICE MIDWIFE

## 2021-03-01 PROCEDURE — 59025 FETAL NON-STRESS TEST: CPT | Performed by: ADVANCED PRACTICE MIDWIFE

## 2021-03-01 PROCEDURE — 81002 URINALYSIS NONAUTO W/O SCOPE: CPT | Performed by: ADVANCED PRACTICE MIDWIFE

## 2021-03-01 PROCEDURE — 3079F DIAST BP 80-89 MM HG: CPT | Performed by: ADVANCED PRACTICE MIDWIFE

## 2021-03-01 PROCEDURE — 3074F SYST BP LT 130 MM HG: CPT | Performed by: ADVANCED PRACTICE MIDWIFE

## 2021-03-01 PROCEDURE — 3008F BODY MASS INDEX DOCD: CPT | Performed by: ADVANCED PRACTICE MIDWIFE

## 2021-03-01 NOTE — PROGRESS NOTES
Ms. Alma Hunter is feeling well overall, denies FVB, LOF, and all danger signs. Reports fetal movement appropriate for gestational age. - Possible IUGR/SGA infant: growth ultrasound this past Saturday showing EFW 7.5%tile.  Unable to obtain pts 20wk ultrasou

## 2021-03-01 NOTE — PROGRESS NOTES
Pt is a 26year old female admitted to TR1/TR1-A. Patient presents with:  Non-stress Test: sent from midwife office, meassuring small      Pt is  39w0d intra-uterine pregnancy. History obtained, consents signed.  Oriented to room, staff, and plan of

## 2021-03-01 NOTE — PROGRESS NOTES
Vaginal exam done. External os 1cm, unable to do complete exam due to discomfort of patient. Agree with assessment and plan by SNM.

## 2021-03-02 ENCOUNTER — APPOINTMENT (OUTPATIENT)
Dept: OBGYN CLINIC | Facility: HOSPITAL | Age: 26
End: 2021-03-02
Payer: MEDICAID

## 2021-03-02 ENCOUNTER — OFFICE VISIT (OUTPATIENT)
Dept: ENDOCRINOLOGY CLINIC | Facility: CLINIC | Age: 26
End: 2021-03-02
Payer: MEDICAID

## 2021-03-02 ENCOUNTER — TELEPHONE (OUTPATIENT)
Dept: OBGYN CLINIC | Facility: CLINIC | Age: 26
End: 2021-03-02

## 2021-03-02 ENCOUNTER — HOSPITAL ENCOUNTER (INPATIENT)
Facility: HOSPITAL | Age: 26
LOS: 3 days | Discharge: HOME | End: 2021-03-05
Attending: ADVANCED PRACTICE MIDWIFE | Admitting: OBSTETRICS & GYNECOLOGY
Payer: MEDICAID

## 2021-03-02 VITALS
HEART RATE: 88 BPM | HEIGHT: 60 IN | WEIGHT: 145 LBS | SYSTOLIC BLOOD PRESSURE: 133 MMHG | DIASTOLIC BLOOD PRESSURE: 86 MMHG | BODY MASS INDEX: 28.47 KG/M2

## 2021-03-02 DIAGNOSIS — E03.9 HYPOTHYROIDISM, UNSPECIFIED TYPE: Primary | ICD-10-CM

## 2021-03-02 LAB
ANTIBODY SCREEN: NEGATIVE
BASOPHILS # BLD AUTO: 0.02 X10(3) UL (ref 0–0.2)
BASOPHILS NFR BLD AUTO: 0.3 %
DEPRECATED RDW RBC AUTO: 46.2 FL (ref 35.1–46.3)
EOSINOPHIL # BLD AUTO: 0.03 X10(3) UL (ref 0–0.7)
EOSINOPHIL NFR BLD AUTO: 0.4 %
ERYTHROCYTE [DISTWIDTH] IN BLOOD BY AUTOMATED COUNT: 14.2 % (ref 11–15)
HCT VFR BLD AUTO: 30.7 %
HGB BLD-MCNC: 10.2 G/DL
IMM GRANULOCYTES # BLD AUTO: 0.02 X10(3) UL (ref 0–1)
IMM GRANULOCYTES NFR BLD: 0.3 %
LYMPHOCYTES # BLD AUTO: 1.39 X10(3) UL (ref 1–4)
LYMPHOCYTES NFR BLD AUTO: 18.6 %
MCH RBC QN AUTO: 29.9 PG (ref 26–34)
MCHC RBC AUTO-ENTMCNC: 33.2 G/DL (ref 31–37)
MCV RBC AUTO: 90 FL
MONOCYTES # BLD AUTO: 0.6 X10(3) UL (ref 0.1–1)
MONOCYTES NFR BLD AUTO: 8 %
NEUTROPHILS # BLD AUTO: 5.4 X10 (3) UL (ref 1.5–7.7)
NEUTROPHILS # BLD AUTO: 5.4 X10(3) UL (ref 1.5–7.7)
NEUTROPHILS NFR BLD AUTO: 72.4 %
PLATELET # BLD AUTO: 174 10(3)UL (ref 150–450)
RBC # BLD AUTO: 3.41 X10(6)UL
RH BLOOD TYPE: POSITIVE
SARS-COV-2 RNA RESP QL NAA+PROBE: NOT DETECTED
WBC # BLD AUTO: 7.5 X10(3) UL (ref 4–11)

## 2021-03-02 PROCEDURE — 86850 RBC ANTIBODY SCREEN: CPT | Performed by: ADVANCED PRACTICE MIDWIFE

## 2021-03-02 PROCEDURE — 3079F DIAST BP 80-89 MM HG: CPT | Performed by: INTERNAL MEDICINE

## 2021-03-02 PROCEDURE — 3008F BODY MASS INDEX DOCD: CPT | Performed by: INTERNAL MEDICINE

## 2021-03-02 PROCEDURE — 3075F SYST BP GE 130 - 139MM HG: CPT | Performed by: INTERNAL MEDICINE

## 2021-03-02 PROCEDURE — 99213 OFFICE O/P EST LOW 20 MIN: CPT | Performed by: INTERNAL MEDICINE

## 2021-03-02 PROCEDURE — 3E0P7VZ INTRODUCTION OF HORMONE INTO FEMALE REPRODUCTIVE, VIA NATURAL OR ARTIFICIAL OPENING: ICD-10-PCS | Performed by: ADVANCED PRACTICE MIDWIFE

## 2021-03-02 PROCEDURE — 86901 BLOOD TYPING SEROLOGIC RH(D): CPT | Performed by: ADVANCED PRACTICE MIDWIFE

## 2021-03-02 PROCEDURE — 85025 COMPLETE CBC W/AUTO DIFF WBC: CPT | Performed by: ADVANCED PRACTICE MIDWIFE

## 2021-03-02 PROCEDURE — 86900 BLOOD TYPING SEROLOGIC ABO: CPT | Performed by: ADVANCED PRACTICE MIDWIFE

## 2021-03-02 RX ORDER — ONDANSETRON 2 MG/ML
4 INJECTION INTRAMUSCULAR; INTRAVENOUS EVERY 6 HOURS PRN
Status: DISCONTINUED | OUTPATIENT
Start: 2021-03-02 | End: 2021-03-03 | Stop reason: HOSPADM

## 2021-03-02 RX ORDER — HYDROMORPHONE HYDROCHLORIDE 1 MG/ML
0.5 INJECTION, SOLUTION INTRAMUSCULAR; INTRAVENOUS; SUBCUTANEOUS EVERY 2 HOUR PRN
Status: DISCONTINUED | OUTPATIENT
Start: 2021-03-02 | End: 2021-03-03

## 2021-03-02 RX ORDER — AMMONIA INHALANTS 0.04 G/.3ML
0.3 INHALANT RESPIRATORY (INHALATION) AS NEEDED
Status: DISCONTINUED | OUTPATIENT
Start: 2021-03-02 | End: 2021-03-03 | Stop reason: HOSPADM

## 2021-03-02 RX ORDER — ACETAMINOPHEN 500 MG
500 TABLET ORAL EVERY 6 HOURS PRN
Status: DISCONTINUED | OUTPATIENT
Start: 2021-03-02 | End: 2021-03-03 | Stop reason: HOSPADM

## 2021-03-02 RX ORDER — HYDROXYZINE HYDROCHLORIDE 50 MG/ML
50 INJECTION, SOLUTION INTRAMUSCULAR ONCE AS NEEDED
Status: ACTIVE | OUTPATIENT
Start: 2021-03-02 | End: 2021-03-02

## 2021-03-02 RX ORDER — ZOLPIDEM TARTRATE 5 MG/1
5 TABLET ORAL NIGHTLY PRN
Status: DISCONTINUED | OUTPATIENT
Start: 2021-03-02 | End: 2021-03-03

## 2021-03-02 RX ORDER — IBUPROFEN 600 MG/1
600 TABLET ORAL EVERY 6 HOURS PRN
Status: DISCONTINUED | OUTPATIENT
Start: 2021-03-02 | End: 2021-03-03

## 2021-03-02 RX ORDER — TRISODIUM CITRATE DIHYDRATE AND CITRIC ACID MONOHYDRATE 500; 334 MG/5ML; MG/5ML
30 SOLUTION ORAL AS NEEDED
Status: DISCONTINUED | OUTPATIENT
Start: 2021-03-02 | End: 2021-03-03 | Stop reason: HOSPADM

## 2021-03-02 RX ORDER — DEXTROSE, SODIUM CHLORIDE, SODIUM LACTATE, POTASSIUM CHLORIDE, AND CALCIUM CHLORIDE 5; .6; .31; .03; .02 G/100ML; G/100ML; G/100ML; G/100ML; G/100ML
INJECTION, SOLUTION INTRAVENOUS CONTINUOUS
Status: DISCONTINUED | OUTPATIENT
Start: 2021-03-02 | End: 2021-03-03 | Stop reason: HOSPADM

## 2021-03-02 RX ORDER — TERBUTALINE SULFATE 1 MG/ML
0.25 INJECTION, SOLUTION SUBCUTANEOUS AS NEEDED
Status: DISCONTINUED | OUTPATIENT
Start: 2021-03-02 | End: 2021-03-03 | Stop reason: HOSPADM

## 2021-03-02 RX ORDER — LIDOCAINE HYDROCHLORIDE 10 MG/ML
30 INJECTION, SOLUTION EPIDURAL; INFILTRATION; INTRACAUDAL; PERINEURAL ONCE
Status: COMPLETED | OUTPATIENT
Start: 2021-03-02 | End: 2021-03-03

## 2021-03-02 NOTE — TELEPHONE ENCOUNTER
Phone call to patient to discuss recommendation of IOL due to growth restriction. Informed patient that we received 20 week ultrasound which showed baby measuring consistent with LMP. Patient states she has a sure LMP of 6/1/20 with regular cycles.  Because

## 2021-03-02 NOTE — PROGRESS NOTES
Name: Kuldeep Hagan  Date: 3/02/21    Referring Physician: No ref. provider found    Patient presents with:  Hypothyroidism: follow up. Reports OBGYN will induce labor.        INITIAL VISIT:   Kuldeep Hagan is a 26year old female with sub change in vision  Neurologic: no headache, generalized or focal weakness or numbness.   Head: normal  ENT: normal  Lungs: no shortness of breath, wheezing or GARCIA  Cardiovascular:  no chest pain or palpitations  Gastrointestinal:  no abdominal pain, bowel mo from other specialties as well as from PCP and going over test results. During the face to face encounter, I spent an additional 10 minutes which were determined for follow-up.  Greater than 50% of the time was spent in counseling, anticipatory guidance, an

## 2021-03-02 NOTE — TRIAGE
[place] [facility]      Triage Note    Coolidge Ross Shaikhenas Patient Status:  Observation    10/22/1995 MRN [id number]   Location [address] Attending Jennifer Montejo, [address] Day # 0 PCP PHYSICIAN HIEN Adams Interpretation: Reactive           Nonstress Test Second Interpretation: Reactive          FHR Category: Category I             Additional Comments Comments: naa herrera notfied of reactive nst, patient dischaged home     Patient presents with:  Non-stress

## 2021-03-03 ENCOUNTER — ANESTHESIA (OUTPATIENT)
Dept: OBGYN UNIT | Facility: HOSPITAL | Age: 26
End: 2021-03-03
Payer: MEDICAID

## 2021-03-03 ENCOUNTER — ANESTHESIA EVENT (OUTPATIENT)
Dept: OBGYN UNIT | Facility: HOSPITAL | Age: 26
End: 2021-03-03
Payer: MEDICAID

## 2021-03-03 PROCEDURE — 87086 URINE CULTURE/COLONY COUNT: CPT | Performed by: ADVANCED PRACTICE MIDWIFE

## 2021-03-03 PROCEDURE — 0UQMXZZ REPAIR VULVA, EXTERNAL APPROACH: ICD-10-PCS | Performed by: ADVANCED PRACTICE MIDWIFE

## 2021-03-03 PROCEDURE — 0UQGXZZ REPAIR VAGINA, EXTERNAL APPROACH: ICD-10-PCS | Performed by: ADVANCED PRACTICE MIDWIFE

## 2021-03-03 PROCEDURE — 87077 CULTURE AEROBIC IDENTIFY: CPT | Performed by: ADVANCED PRACTICE MIDWIFE

## 2021-03-03 PROCEDURE — 87186 SC STD MICRODIL/AGAR DIL: CPT | Performed by: ADVANCED PRACTICE MIDWIFE

## 2021-03-03 RX ORDER — DIAPER,BRIEF,INFANT-TODD,DISP
1 EACH MISCELLANEOUS EVERY 6 HOURS PRN
Status: DISCONTINUED | OUTPATIENT
Start: 2021-03-03 | End: 2021-03-05

## 2021-03-03 RX ORDER — HYDROMORPHONE HYDROCHLORIDE 1 MG/ML
INJECTION, SOLUTION INTRAMUSCULAR; INTRAVENOUS; SUBCUTANEOUS
Status: COMPLETED
Start: 2021-03-03 | End: 2021-03-03

## 2021-03-03 RX ORDER — ONDANSETRON 2 MG/ML
4 INJECTION INTRAMUSCULAR; INTRAVENOUS EVERY 6 HOURS PRN
Status: DISCONTINUED | OUTPATIENT
Start: 2021-03-03 | End: 2021-03-05

## 2021-03-03 RX ORDER — LEVOTHYROXINE SODIUM 0.03 MG/1
25 TABLET ORAL
Status: DISCONTINUED | OUTPATIENT
Start: 2021-03-03 | End: 2021-03-05

## 2021-03-03 RX ORDER — NALBUPHINE HCL 10 MG/ML
2.5 AMPUL (ML) INJECTION
Status: DISCONTINUED | OUTPATIENT
Start: 2021-03-03 | End: 2021-03-03 | Stop reason: RX

## 2021-03-03 RX ORDER — ACETAMINOPHEN 325 MG/1
650 TABLET ORAL EVERY 6 HOURS PRN
Status: DISCONTINUED | OUTPATIENT
Start: 2021-03-03 | End: 2021-03-05

## 2021-03-03 RX ORDER — LIDOCAINE HYDROCHLORIDE AND EPINEPHRINE 15; 5 MG/ML; UG/ML
INJECTION, SOLUTION EPIDURAL AS NEEDED
Status: DISCONTINUED | OUTPATIENT
Start: 2021-03-03 | End: 2021-03-03 | Stop reason: SURG

## 2021-03-03 RX ORDER — SIMETHICONE 80 MG
80 TABLET,CHEWABLE ORAL 3 TIMES DAILY PRN
Status: DISCONTINUED | OUTPATIENT
Start: 2021-03-03 | End: 2021-03-05

## 2021-03-03 RX ORDER — IBUPROFEN 600 MG/1
600 TABLET ORAL EVERY 6 HOURS
Status: DISCONTINUED | OUTPATIENT
Start: 2021-03-03 | End: 2021-03-05

## 2021-03-03 RX ORDER — BISACODYL 10 MG
10 SUPPOSITORY, RECTAL RECTAL ONCE AS NEEDED
Status: DISCONTINUED | OUTPATIENT
Start: 2021-03-03 | End: 2021-03-05

## 2021-03-03 RX ORDER — LIDOCAINE HYDROCHLORIDE 10 MG/ML
INJECTION, SOLUTION INFILTRATION; PERINEURAL
Status: COMPLETED | OUTPATIENT
Start: 2021-03-03 | End: 2021-03-03

## 2021-03-03 RX ORDER — DOCUSATE SODIUM 100 MG/1
100 CAPSULE, LIQUID FILLED ORAL
Status: DISCONTINUED | OUTPATIENT
Start: 2021-03-03 | End: 2021-03-05

## 2021-03-03 RX ORDER — HYDROXYZINE HYDROCHLORIDE 50 MG/ML
50 INJECTION, SOLUTION INTRAMUSCULAR EVERY 4 HOURS PRN
Status: DISCONTINUED | OUTPATIENT
Start: 2021-03-03 | End: 2021-03-03

## 2021-03-03 RX ORDER — BUPIVACAINE HYDROCHLORIDE 2.5 MG/ML
20 INJECTION, SOLUTION EPIDURAL; INFILTRATION; INTRACAUDAL ONCE
Status: COMPLETED | OUTPATIENT
Start: 2021-03-03 | End: 2021-03-03

## 2021-03-03 RX ORDER — AMMONIA INHALANTS 0.04 G/.3ML
0.3 INHALANT RESPIRATORY (INHALATION) AS NEEDED
Status: DISCONTINUED | OUTPATIENT
Start: 2021-03-03 | End: 2021-03-05

## 2021-03-03 RX ORDER — BUPIVACAINE HYDROCHLORIDE 2.5 MG/ML
30 INJECTION, SOLUTION EPIDURAL; INFILTRATION; INTRACAUDAL ONCE
Status: DISCONTINUED | OUTPATIENT
Start: 2021-03-03 | End: 2021-03-03

## 2021-03-03 RX ORDER — HYDROMORPHONE HYDROCHLORIDE 1 MG/ML
1 INJECTION, SOLUTION INTRAMUSCULAR; INTRAVENOUS; SUBCUTANEOUS EVERY 2 HOUR PRN
Status: DISCONTINUED | OUTPATIENT
Start: 2021-03-03 | End: 2021-03-03

## 2021-03-03 RX ORDER — HYDROXYZINE HYDROCHLORIDE 50 MG/ML
INJECTION, SOLUTION INTRAMUSCULAR
Status: DISCONTINUED
Start: 2021-03-03 | End: 2021-03-03

## 2021-03-03 RX ORDER — BUPIVACAINE HCL/0.9 % NACL/PF 0.25 %
5 PLASTIC BAG, INJECTION (ML) EPIDURAL AS NEEDED
Status: DISCONTINUED | OUTPATIENT
Start: 2021-03-03 | End: 2021-03-03

## 2021-03-03 RX ADMIN — LIDOCAINE HYDROCHLORIDE 5 ML: 10 INJECTION, SOLUTION INFILTRATION; PERINEURAL at 08:01:00

## 2021-03-03 RX ADMIN — BUPIVACAINE HYDROCHLORIDE 5 ML: 2.5 INJECTION, SOLUTION EPIDURAL; INFILTRATION; INTRACAUDAL at 08:11:00

## 2021-03-03 RX ADMIN — LIDOCAINE HYDROCHLORIDE AND EPINEPHRINE 3 ML: 15; 5 INJECTION, SOLUTION EPIDURAL at 08:10:00

## 2021-03-03 RX ADMIN — BUPIVACAINE HYDROCHLORIDE 5 ML: 2.5 INJECTION, SOLUTION EPIDURAL; INFILTRATION; INTRACAUDAL at 08:12:00

## 2021-03-03 NOTE — PLAN OF CARE
Problem: BIRTH - VAGINAL/ SECTION  Goal: Fetal and maternal status remain reassuring during the birth process  Description: INTERVENTIONS:  - Monitor vital signs  - Monitor fetal heart rate  - Monitor uterine activity  - Monitor labor progression Goals  Goal: Patient/Family Long Term Goal  Description: Patient's Long Term Goal: uncomplicated vaginal delivery    Interventions:  - intervene as needed  - See additional Care Plan goals for specific interventions  Outcome: Progressing  Goal: Patient/Fam

## 2021-03-03 NOTE — H&P
[address] Patient Status:  Inpatient    10/22/1995 MRN [id number]   Location [address] Attending Adán Gonzáles, [address] Day # 0 PCP PHYSICIAN NONSTAFF     Bimal before breakfast., Disp: 30 tablet, Rfl: 4    •  Prenatal Vit-Fe Fumarate-FA ( PRENATAL VITAMINS) 28-0.8 MG Oral Tab, Take by mouth., Disp: , Rfl:         Review of Systems:   As documented in HPI    no complaints and good fetal movement    Physical Exam 39w1d with an estimated date of delivery of:  3/8/2021, by Last Menstrual Period    Not in labor. Obstetrical history significant for recurrent UTI, IUGR, hypothyroid.     Admission problem(s):  Pregnancy    IUGR     EFW 2761gm, 7.5%ile on ultrasound on 2/

## 2021-03-03 NOTE — PROGRESS NOTES
[place] [facility] - [facility]    Labor Progress Note    Kylie Svetlanakaryna Blount Patient Status:  Inpatient    10/22/1995 MRN [id number]   Location [address] [facility] Attending Zhane Rigginsr, [address] Day # 1 PCP PHYSICIAN NONSTAFF Negative 2020    BLOODURINE Negative 2020    NITRITE neg 2021    UROBILINOGEN <2.0 2020    LEUUR Trace (A) 2020           Assessment/Plan     Pregnancy    24yo  at 39.2wks. IOL for IUGR.  Pregnancy complicated by IUGR, recu

## 2021-03-03 NOTE — L&D DELIVERY NOTE
Drake Guy [[id number]]    Labor Events     labor?: No   steroids?: None  Cervical ripening type: Misoprostol  Antibiotics received during labor?: No  Antibiotics (enter # doses in comment): none  Rupture date/time: 3/3/2021 0815 Skin color Blue or pale Acrocyanotic Completely pink    Heart rate Absent <100 bpm >100 bpm    Reflex irritability No response Grimace Cry or active withdrawal    Muscle tone Limp Some flexion Active motion    Respiratory effort Absent Weak cry; hypovent Harriet WHITTAKER under the direct supervision of Kal Blue CNM

## 2021-03-03 NOTE — PROGRESS NOTES
[place] [facility] - [facility]    Labor Progress Note    Luz Blount Patient Status:  Inpatient    10/22/1995 MRN [id number]   Location [address]  Attending Shekhar Benedict, [address] Day # 1 PCP PHYSICIAN NONSTAFF UROBILINOGEN <2.0 11/16/2020    LEUUR Trace (A) 11/16/2020           Assessment/Plan     IUGR  -S/P 1 dose of cytotec last night for which had tachysystole with prolonged decel. Following had category 1 tracing.  Currently category 2 for occasional mild chris

## 2021-03-03 NOTE — ANESTHESIA PREPROCEDURE EVALUATION
Anesthesia PreOp Note    HPI:     Elizabeth Cha is a 26year old female who presents for preoperative consultation requested by: Dr. Tiffany Greer     Date of Surgery: 3/3/2021    Labor epidural  Indication: Active labor     Relevant Problems   No rele Bonnie Colon MD    •  fentaNYL citrate (SUBLIMAZE) 0.05 MG/ML injection 100 mcg, 100 mcg, Epidural, Once, Bonnie Colon MD    •  bupivacaine PF (MARCAINE) 0.25% injection, 20 mL, Epidural, Once, Bonnie Colon MD    •  EPHEDrine sulfate (PF) 25 Occupation: unemployed    Social Needs      Financial resource strain: Somewhat hard      Food insecurity        Worry: Sometimes true        Inability: Never true      Transportation needs        Medical: Yes        Non-medical: Yes    Tobacco Use      Sm 03/02/2021    MCHC 33.2 03/02/2021    RDW 14.2 03/02/2021    .0 03/02/2021             Vital Signs: There is no height or weight on file to calculate BMI. oral temperature is 98 °F (36.7 °C). Her blood pressure is 113/82 and her pulse is 98.  Her and any alternative forms of anesthetic management. All of the patient's questions were answered to the best of my ability. The patient desires the anesthetic management as planned.   Amie Vincent  3/3/2021 8:27 AM

## 2021-03-03 NOTE — CM/SW NOTE
ALEXANDRA self referral due to insurance    SW met with patient and FOB Jony bedside. SW confirmed face sheet contact as correct.     Baby boy/girl name: Baby boy Nurys Mondragon  Date & time of delivery:  3/3/2021 @ 11:41 AM  Delivery method: Normal spontaneous vaginal de

## 2021-03-03 NOTE — PROGRESS NOTES
[place] [facility] - [facility]    Labor Progress Note    Alison Blount Patient Status:  Inpatient    10/22/1995 MRN [id number]   Location [address] Attending Erik Sat, [address] Day # 1 PCP PHYSICIAN NONSTAFF BILUR Negative 2020    BLOODURINE Negative 2020    NITRITE neg 2021    UROBILINOGEN <2.0 2020    LEUUR Trace (A) 2020           Assessment/Plan   Pregnancy    26yo  at 39.2wks IOL for IUGR.  Pregnancy complicated by recurre

## 2021-03-03 NOTE — PLAN OF CARE
Problem: BIRTH - VAGINAL/ SECTION  Goal: Fetal and maternal status remain reassuring during the birth process  Description: INTERVENTIONS:  - Monitor vital signs  - Monitor fetal heart rate  - Monitor uterine activity  - Monitor labor progression perspectives and choices  Outcome: Progressing     Problem: Patient/Family Goals  Goal: Patient/Family Long Term Goal  Description: Patient's Long Term Goal: uncomplicated vaginal delivery    Interventions:  - intervene as needed  - See additional Care Choco

## 2021-03-03 NOTE — PAYOR COMM NOTE
ADMISSION REVIEW     Payor: Luis Huber #:  [id number]  Authorization Number: [id number]    Admit date: 3/2/21  Admit time: 1910       Admitting Physician: Alpesh Lira MD  Attending Physician:  Enrico Titus No past surgical history on file.   Family History:   Family History   Problem Relation Age of Onset   • Diabetes Maternal Grandfather         type 2     Social History: Social History    Tobacco Use      Smoking status: Never Smoker      Smokeless tobacco: AST 16 11/16/2020    ALT 15 11/16/2020    T4F 1.2 11/30/2020    TSH 2.110 11/30/2020     11/16/2020       Lab Results   Component Value Date    COLORUR Yellow 11/16/2020    CLARITY Hazy (A) 11/16/2020    SPECGRAVITY 1.025 03/01/2021    PROUR Negativ Raffaele Hart MD      dextrose 5 % /lactated ringers infusion     Date Action Dose Route User    3/3/2021 6435 New [address] Long [facility] (none) Intravenous Fanny Cabrera RN    3/2/2021 2221 New Bag (none) Intravenous Osorio Melara, RN      fentaNYL 2mcg/ml & bupivacaine 1 0.25 mg Subcutaneous (Right Upper Arm) Jessica Gabriel RN      zolpidem [facility]) tab 5 mg     Date Action Dose Route User    3/2/2021 2228 Given 5 mg Oral ADAM Acevedo Boy [[id number]]    Labor Events     labor?: No Measurements           Weight: 2920 g 6 lb 7 oz Length: 48 cm    Head circum.: 31 cm             Placenta    Date/time: 3/3/2021 1145  Removal: Spontaneous  Appearance: Intact  Disposition: Pathology      Apgars    Living status: Living    Apgar Scoring Ke repaired in the usual fashion. Left labia minora tear noted and repaired. Small posterior vaginal wall abrasion noted and repaired with one interrupted stitch. All repairs hemostatic and well-approximated. Counts correct.  ml.   Mother and infant Maria Alejandra Roman

## 2021-03-03 NOTE — ANESTHESIA PROCEDURE NOTES
Labor Analgesia    Date/Time: 3/3/2021 8:01 AM  Performed by: Angélica Carrillo MD  Authorized by: Angélica Carrillo MD       General Information and Staff    Start Time:  3/3/2021 8:01 AM  End Time:  3/3/2021 8:15 AM  Anesthesiologist:  Angélica Carrillo,

## 2021-03-03 NOTE — ANESTHESIA POSTPROCEDURE EVALUATION
Patient: Rosalina Blount    Procedure Summary     Date: 03/03/21 Room / Location:     [facility] Stop: 7722    Procedure: LABOR ANALGESIA Diagnosis:     Scheduled Providers:  Anesthesiologist: MD Janie Shukla

## 2021-03-03 NOTE — PROGRESS NOTES
Patient up to bathroom with assist x 1. Voided 100 ml. Patient transferred to mother/baby room 351 per wheelchair in stable condition with baby and personal belongings. Accompanied by significant other and staff.   Report given to mother/baby RN Richard Delgado

## 2021-03-03 NOTE — PROGRESS NOTES
Pt received into room 351. Pt transferred via wheelchair. [facility] Assessment and vitals WNL. Pt oriented to room, bed in lowest position, locked, siderails up x2, call light within reach. POC reviewed.

## 2021-03-04 PROBLEM — O36.5930 POOR FETAL GROWTH AFFECTING MANAGEMENT OF MOTHER IN THIRD TRIMESTER: Status: ACTIVE | Noted: 2021-03-04

## 2021-03-04 PROBLEM — O36.5930 POOR FETAL GROWTH AFFECTING MANAGEMENT OF MOTHER IN THIRD TRIMESTER (HCC): Status: ACTIVE | Noted: 2021-03-04

## 2021-03-04 LAB
BASOPHILS # BLD AUTO: 0.01 X10(3) UL (ref 0–0.2)
BASOPHILS NFR BLD AUTO: 0.1 %
DEPRECATED RDW RBC AUTO: 48.6 FL (ref 35.1–46.3)
EOSINOPHIL # BLD AUTO: 0.02 X10(3) UL (ref 0–0.7)
EOSINOPHIL NFR BLD AUTO: 0.2 %
ERYTHROCYTE [DISTWIDTH] IN BLOOD BY AUTOMATED COUNT: 14.7 % (ref 11–15)
HCT VFR BLD AUTO: 24.3 %
HGB BLD-MCNC: 8 G/DL
IMM GRANULOCYTES # BLD AUTO: 0.05 X10(3) UL (ref 0–1)
IMM GRANULOCYTES NFR BLD: 0.5 %
LYMPHOCYTES # BLD AUTO: 1.14 X10(3) UL (ref 1–4)
LYMPHOCYTES NFR BLD AUTO: 11 %
MCH RBC QN AUTO: 29.9 PG (ref 26–34)
MCHC RBC AUTO-ENTMCNC: 32.9 G/DL (ref 31–37)
MCV RBC AUTO: 90.7 FL
MONOCYTES # BLD AUTO: 0.7 X10(3) UL (ref 0.1–1)
MONOCYTES NFR BLD AUTO: 6.7 %
NEUTROPHILS # BLD AUTO: 8.47 X10 (3) UL (ref 1.5–7.7)
NEUTROPHILS # BLD AUTO: 8.47 X10(3) UL (ref 1.5–7.7)
NEUTROPHILS NFR BLD AUTO: 81.5 %
PLATELET # BLD AUTO: 123 10(3)UL (ref 150–450)
RBC # BLD AUTO: 2.68 X10(6)UL
TSI SER-ACNC: 1.13 MIU/ML (ref 0.36–3.74)
WBC # BLD AUTO: 10.4 X10(3) UL (ref 4–11)

## 2021-03-04 PROCEDURE — 84443 ASSAY THYROID STIM HORMONE: CPT | Performed by: ADVANCED PRACTICE MIDWIFE

## 2021-03-04 PROCEDURE — 85025 COMPLETE CBC W/AUTO DIFF WBC: CPT | Performed by: ADVANCED PRACTICE MIDWIFE

## 2021-03-04 PROCEDURE — 88307 TISSUE EXAM BY PATHOLOGIST: CPT | Performed by: ADVANCED PRACTICE MIDWIFE

## 2021-03-04 NOTE — PROGRESS NOTES
[facility]    OB/GYNE Progress Note      Radha Garcia Patient Status:  Inpatient    10/22/1995 MRN [id number]   Location [facility] 3SE Attending Karen Santoro, [address] Day # 2 PCP PHYSICIAN NONSTAFF        Assessment/ 137 lb 4 oz (62.3 kg)    There is no height or weight on file to calculate BMI. Exam:  Constitutional: Comfortable.  Partner was holding infant  Uterus: Fundus below umbilicus   Wound/Incision: Well-approximated, no swelling or edema, small lochia rubra

## 2021-03-04 NOTE — PROGRESS NOTES
7333    Notified CHRIS Ramos of urine culture results >100,000 gn. CHRIS states she will round on patient this morning.      Erin Ocampo, 03/04/21

## 2021-03-04 NOTE — PLAN OF CARE
Sat with patient to review plan of care. Discussed analgesic options and answered all questions. VSS. Bottlefeeding on demand. Bottlefeeding successfully. Voiding independently. Lochia is WNL. Uterus is firm.      Problem: PAIN - ADULT  Goal: Verbalizes/dis delivery    Interventions:  - intervene as needed  - See additional Care Plan goals for specific interventions  Outcome: Progressing  Goal: Patient/Family Short Term Goal  Description: Patient's Short Term Goal: pain management    Interventions:   - IV bradley footstool/pillows, and breast pumps). - Encourage mother/other family members to express feelings/concerns, and actively listen. - Educate father/SO about benefits of breast feeding and how to manage common lactation challenges.   - Recommend avoidance of

## 2021-03-05 ENCOUNTER — TELEPHONE (OUTPATIENT)
Dept: OBGYN CLINIC | Facility: CLINIC | Age: 26
End: 2021-03-05

## 2021-03-05 VITALS
SYSTOLIC BLOOD PRESSURE: 120 MMHG | OXYGEN SATURATION: 96 % | HEART RATE: 81 BPM | RESPIRATION RATE: 16 BRPM | DIASTOLIC BLOOD PRESSURE: 70 MMHG | TEMPERATURE: 99 F

## 2021-03-05 RX ORDER — IBUPROFEN 600 MG/1
600 TABLET ORAL EVERY 6 HOURS
Qty: 60 TABLET | Refills: 0 | Status: SHIPPED | OUTPATIENT
Start: 2021-03-05 | End: 2021-04-14

## 2021-03-05 RX ORDER — BREAST PUMP
EACH MISCELLANEOUS
Qty: 1 EACH | Refills: 0 | Status: SHIPPED | OUTPATIENT
Start: 2021-03-05 | End: 2021-04-02

## 2021-03-05 RX ORDER — FERROUS SULFATE 325(65) MG
325 TABLET ORAL
Qty: 30 TABLET | Refills: 0 | Status: SHIPPED | OUTPATIENT
Start: 2021-03-05 | End: 2021-04-14

## 2021-03-05 NOTE — PROGRESS NOTES
[place] [facility] - [facility]    OB/GYNE Progress Note      Lalita Barrgaan Patient Status:  Inpatient    10/22/1995 MRN [id number]   Location [facility] 3SE Attending Shruthi Peterson, [address] Day # 3 PCP PHYSICIAN NONSTAFF        Assessment/ kg)  03/01/21 : 146 lb (66.2 kg)  02/22/21 : 146 lb 9.6 oz (66.5 kg)  02/16/21 : 142 lb 12.8 oz (64.8 kg)  02/08/21 : 142 lb 9.6 oz (64.7 kg)  01/18/21 : 137 lb 4 oz (62.3 kg)    There is no height or weight on file to calculate BMI.      Exam:  Constitutio

## 2021-03-05 NOTE — DISCHARGE SUMMARY
[place] [facility] - [facility]    Discharge Summary    Allyson Blount Patient Status:  Inpatient    10/22/1995 MRN [id number]   Location [facility] 3SE Attending No att. providers found   [facility] Day # 3       Delivering OB Clinician: Isaac Wolf

## 2021-03-05 NOTE — PLAN OF CARE
Problem: ANXIETY  Goal: Will report anxiety at manageable levels  Description: INTERVENTIONS:  - Administer medication as ordered  - Teach and rehearse alternative coping skills  - Provide emotional support with 1:1 interaction with staff  Outcome: Progr feelings/concerns, and actively listen. - Educate father/SO about benefits of breast feeding and how to manage common lactation challenges.   - Recommend avoidance of specific medications or substances incompatible with breast feeding.  - Assess and monito

## 2021-03-05 NOTE — TELEPHONE ENCOUNTER
----- Message from Lilia Leventhal, CNM sent at 3/5/2021 11:10 AM CST -----  Regarding: Breast pump  Patient states she did not receive her breast pump. It looks like order was placed but when she called [facility] was told that it was not valid.  She is going sherie

## 2021-03-05 NOTE — TELEPHONE ENCOUNTER
Spoke w/ Leilani Espinoza at [facility].  Routed the request to [facility] to fax order to [facility]

## 2021-03-07 RX ORDER — CEPHALEXIN 500 MG/1
500 CAPSULE ORAL 2 TIMES DAILY
Qty: 14 CAPSULE | Refills: 0 | Status: SHIPPED | OUTPATIENT
Start: 2021-03-07 | End: 2021-03-14

## 2021-03-11 ENCOUNTER — TELEPHONE (OUTPATIENT)
Dept: OBGYN UNIT | Facility: HOSPITAL | Age: 26
End: 2021-03-11

## 2021-03-12 ENCOUNTER — TELEPHONE (OUTPATIENT)
Dept: OBGYN UNIT | Facility: HOSPITAL | Age: 26
End: 2021-03-12

## 2021-03-18 ENCOUNTER — TELEPHONE (OUTPATIENT)
Dept: OBGYN CLINIC | Facility: CLINIC | Age: 26
End: 2021-03-18

## 2021-03-18 NOTE — TELEPHONE ENCOUNTER
Advised pt she can schedule a 2wk pp visit & 6wk pp visit. 2wk visit can be virtual if pt feels well & has no concerns.  Pt states she is doing well & virtual visit scheduled w/ MJ. Pt verbalized an understanding & agrees w/ plan

## 2021-03-19 ENCOUNTER — VIRTUAL PHONE E/M (OUTPATIENT)
Dept: OBGYN CLINIC | Facility: CLINIC | Age: 26
End: 2021-03-19
Payer: MEDICAID

## 2021-03-19 NOTE — PROGRESS NOTES
Virtual Telephone Check-In    Leona Ashraf verbally consents to a Virtual/Telephone Check-In visit on 03/19/21. Patient has been referred to the [facility] website at [email or web address] to review the yearly Consent to Treat document.     Patient Last 6 Encounters:  03/02/21 : 145 lb (65.8 kg)  03/01/21 : 146 lb (66.2 kg)  02/22/21 : 146 lb 9.6 oz (66.5 kg)  02/16/21 : 142 lb 12.8 oz (64.8 kg)  02/08/21 : 142 lb 9.6 oz (64.7 kg)  01/18/21 : 137 lb 4 oz (62.3 kg)      OBGyn Exam     Assessment/Plan:

## 2021-04-01 ENCOUNTER — TELEPHONE (OUTPATIENT)
Dept: OBGYN CLINIC | Facility: CLINIC | Age: 26
End: 2021-04-01

## 2021-04-01 NOTE — TELEPHONE ENCOUNTER
pt. concerned about vaginal cut which looks open, where the stitches were put after delivery of her baby. Pt. States that she delivered on 3/3/2021.

## 2021-04-01 NOTE — TELEPHONE ENCOUNTER
Pt reports area where she received stitches after delivery looks \"split\" and she is experiencing discomfort. Pt states she does not feel pain but needs to sit at an angle. Pt also reports stinging towards the end of urination.  Pt advised she should be se

## 2021-04-02 ENCOUNTER — POSTPARTUM (OUTPATIENT)
Dept: OBGYN CLINIC | Facility: CLINIC | Age: 26
End: 2021-04-02
Payer: MEDICAID

## 2021-04-02 VITALS
BODY MASS INDEX: 25 KG/M2 | HEART RATE: 96 BPM | WEIGHT: 126.19 LBS | SYSTOLIC BLOOD PRESSURE: 129 MMHG | DIASTOLIC BLOOD PRESSURE: 81 MMHG

## 2021-04-02 DIAGNOSIS — N94.9 PERINEAL PAIN IN FEMALE: Primary | ICD-10-CM

## 2021-04-02 PROCEDURE — 99024 POSTOP FOLLOW-UP VISIT: CPT | Performed by: ADVANCED PRACTICE MIDWIFE

## 2021-04-02 PROCEDURE — 3079F DIAST BP 80-89 MM HG: CPT | Performed by: ADVANCED PRACTICE MIDWIFE

## 2021-04-02 PROCEDURE — 3074F SYST BP LT 130 MM HG: CPT | Performed by: ADVANCED PRACTICE MIDWIFE

## 2021-04-02 NOTE — PROGRESS NOTES
Patient presents with:  Postpartum Care: 4wks PP. GAD7=8. Notes opening where stitches were. HPI:   Fitz Giang is 26year old , here today with concern for pain in left labia.  She states that she looked at the area and thinks that her stitches Constitutional:       Appearance: Normal appearance. HENT:      Head: Normocephalic. Pulmonary:      Effort: Pulmonary effort is normal.   Genitourinary:     Labia:         Right: No rash, tenderness, lesion or injury.          Left: No rash, tenderness

## 2021-04-02 NOTE — PATIENT INSTRUCTIONS
Understanding Postpartum Depression  It’s common for new moms to feel tired and gonzalez after having a baby.  But if you have very strong feelings of sadness or anger, or have trouble doing your normal daily tasks, you may have a serious mood disorder stephanie also drop at the same time. · Depression. You may be at greater risk if you have a history of depression, or if you are currently being treated for depression. · Extreme tiredness (fatigue).  It can take a few weeks to physically recover from giving birth themselves  · May have thoughts of harming their baby  Are you at risk for postpartum depression?   You are more at risk for this condition if you have:  · A history of depression (including postpartum depression in a past pregnancy)  · A family member with meals, shopping, and laundry  To learn more  Find more information at:  · Post[employer] www. postpartum.net/  · Office on [address] www.womenshealth.gov/  Mavis last reviewed this educational content on 6/1/2020  © 0752-1288 The S therapy may be used alone if antidepressants have not worked well for you in the past. Talk with your healthcare provider about this. Most women find their condition gets better with these treatments.  Other things you can do at home that may also help yo advised for breastfeeding women. Your healthcare provider will monitor you while you are on a medicine. Antidepressants may take a few weeks to start working. But if you don’t feel better in a few weeks, call your healthcare provider.  Together you may d · Trouble sleeping  · Weight gain or weight loss  · Dry mouth  · Dizziness  · Lack of interest in sex  · Diarrhea  · Headaches  If any of these symptoms don’t go away, or if they get in the way of your daily tasks, call your healthcare provider.  He or she go away on their own in about 2 weeks. If you still feel this way after more than 2 weeks, or if at any time you start to feel worse, call your healthcare provider. You could have a more serious mood disorder called postpartum depression.  This is treated yourself. · Get enough sleep. It may not be possible to sleep through the night right now. But rest whenever your baby naps. Don’t use that time to do chores. · Eat healthy foods.  Eating a healthy diet can help you feel better, and help keep your mood mo Shakiness  · Dizziness  · Weakness  · Trouble breathing  · Breathing fast (hyperventilating)  · Chest pressure  · Sweating  · Headache  · Nausea  · Diarrhea  · Tiredness  · Inability to sleep  · Sexual problems  Home care  · Try to find the sources of stre Fainting or loss of consciousness  · Rapid heart rate  · Seizure  · New chest pain that becomes more severe, lasts longer, or spreads into your shoulder, arm, neck, jaw, or back  When to get medical advice  Call your healthcare provider right away if any o disorder. This causes a very strong fear of being in danger. · Phobias. These are extreme fears of certain things, places, or events. · Obsessive-compulsive disorder (OCD). This makes you have unwanted thoughts and urges.  You also may do certain things o to heal. Carbohydrates are found in grains, fruits, beans, and other legumes. · Fats. Healthy fats help your organs, skin, hair, and brain. They also help your body absorb certain vitamins. · Vitamins.  These include vitamins C, D, B-6, B-12, folate, and your body build and repair tissues. Protein also helps your immune system work well. This helps protect wounds from infection and let them heal. Infection can delay healing.  To get enough protein while you’re healing, you can:   · Add protein to every meal your blood sugar as directed. Tell your healthcare provider if your blood sugar is not under control. He or she can help you get it back on track. Mavis last reviewed this educational content on 5/1/2020  © 4039-0617 The [employer] 4037.  All rig cloth to gently pat the wound dry. Then replace the bandage with a dry one. · Don't scratch, rub, or pick at the area. · Watch for the signs of infection listed below. Any wound can get infected, even if you are taking antibiotics.  Seek care right away i care  Follow up with your healthcare provider, or as directed. If you have stitches or staples, return for their removal as directed.   When to seek medical advice  Call your healthcare provider right away if you have signs of infection:  · Fever of 100.4°F

## 2021-04-14 ENCOUNTER — POSTPARTUM (OUTPATIENT)
Dept: OBGYN CLINIC | Facility: CLINIC | Age: 26
End: 2021-04-14
Payer: MEDICAID

## 2021-04-14 ENCOUNTER — LAB ENCOUNTER (OUTPATIENT)
Dept: LAB | Facility: HOSPITAL | Age: 26
End: 2021-04-14
Attending: ADVANCED PRACTICE MIDWIFE
Payer: MEDICAID

## 2021-04-14 VITALS
SYSTOLIC BLOOD PRESSURE: 114 MMHG | DIASTOLIC BLOOD PRESSURE: 79 MMHG | WEIGHT: 121.38 LBS | BODY MASS INDEX: 24 KG/M2 | HEART RATE: 92 BPM

## 2021-04-14 DIAGNOSIS — O99.019 ANTEPARTUM ANEMIA: ICD-10-CM

## 2021-04-14 DIAGNOSIS — O99.019 ANTEPARTUM ANEMIA: Primary | ICD-10-CM

## 2021-04-14 DIAGNOSIS — E07.9 THYROID DYSFUNCTION: ICD-10-CM

## 2021-04-14 PROBLEM — Z63.9 FAMILY TENSION: Status: ACTIVE | Noted: 2021-04-14

## 2021-04-14 PROCEDURE — 36415 COLL VENOUS BLD VENIPUNCTURE: CPT

## 2021-04-14 PROCEDURE — 3074F SYST BP LT 130 MM HG: CPT | Performed by: ADVANCED PRACTICE MIDWIFE

## 2021-04-14 PROCEDURE — 0503F POSTPARTUM CARE VISIT: CPT | Performed by: ADVANCED PRACTICE MIDWIFE

## 2021-04-14 PROCEDURE — 3078F DIAST BP <80 MM HG: CPT | Performed by: ADVANCED PRACTICE MIDWIFE

## 2021-04-14 PROCEDURE — 85027 COMPLETE CBC AUTOMATED: CPT

## 2021-04-14 PROCEDURE — 84443 ASSAY THYROID STIM HORMONE: CPT

## 2021-04-14 RX ORDER — NORETHINDRONE ACETATE AND ETHINYL ESTRADIOL .03; 1.5 MG/1; MG/1
1 TABLET ORAL DAILY
Qty: 1 PACKAGE | Refills: 11 | Status: SHIPPED | OUTPATIENT
Start: 2021-04-14 | End: 2021-05-12

## 2021-04-14 NOTE — PROGRESS NOTES
Patient here for postpartum check-up. Vaginal delivery @ 6 weeks ago with CNM Bottlefeeding. Baby with adequate weight gain. Denies fevers, chills, body aches and flu-like symptoms. Denies abdominal pain, no pelvic pain, reports no vaginal bleeding.  Ble

## 2021-04-14 NOTE — PATIENT INSTRUCTIONS
ORAL CONTRACEPTIVE PILL INSTRUCTION    The name of my oral contraceptive pill is ____________________________________________    When do I start my “pills”? 1. On the day your period starts  2.  The Sunday after your period starts whether or not your perio schedule  3. If you miss pills you should use a back-up method of contraception, such as condoms until your next pack of pills. 4. If you miss more than 2 pills. Call the office for instructions. When should I use additional contraception? 1.  Use a b

## 2021-04-15 ENCOUNTER — TELEPHONE (OUTPATIENT)
Dept: OBGYN CLINIC | Facility: CLINIC | Age: 26
End: 2021-04-15

## 2021-04-15 NOTE — TELEPHONE ENCOUNTER
Pt advised of lab results and [facility] rec's. Referral for Hiral Heller was faxed yesterday. Pt advised if she does not get call from Celi Espitia by Monday she should call us to let us know. Pt agreed and voiced understanding.

## 2021-04-15 NOTE — TELEPHONE ENCOUNTER
----- Message from Trang Morrow CNM sent at 4/15/2021  1:13 PM CDT -----  Her labs are normal.  Can you see if she connected with  support group?

## 2021-04-19 ENCOUNTER — TELEPHONE (OUTPATIENT)
Dept: OBGYN CLINIC | Facility: CLINIC | Age: 26
End: 2021-04-19

## 2021-07-28 ENCOUNTER — OFFICE VISIT (OUTPATIENT)
Dept: OBGYN CLINIC | Facility: CLINIC | Age: 26
End: 2021-07-28
Payer: MEDICAID

## 2021-07-28 VITALS
DIASTOLIC BLOOD PRESSURE: 77 MMHG | HEIGHT: 60 IN | BODY MASS INDEX: 22.58 KG/M2 | HEART RATE: 92 BPM | WEIGHT: 115 LBS | SYSTOLIC BLOOD PRESSURE: 116 MMHG

## 2021-07-28 DIAGNOSIS — Z63.8 STRESS DUE TO FAMILY TENSION: Primary | ICD-10-CM

## 2021-07-28 DIAGNOSIS — Z30.017 INSERTION OF IMPLANTABLE SUBDERMAL CONTRACEPTIVE: ICD-10-CM

## 2021-07-28 DIAGNOSIS — Z32.02 PREGNANCY EXAMINATION OR TEST, NEGATIVE RESULT: ICD-10-CM

## 2021-07-28 DIAGNOSIS — Z11.3 SCREEN FOR STD (SEXUALLY TRANSMITTED DISEASE): ICD-10-CM

## 2021-07-28 DIAGNOSIS — F41.9 ANXIETY: ICD-10-CM

## 2021-07-28 LAB
CONTROL LINE PRESENT WITH A CLEAR BACKGROUND (YES/NO): YES YES/NO
KIT LOT #: NORMAL NUMERIC
PREGNANCY TEST, URINE: NEGATIVE

## 2021-07-28 PROCEDURE — 81025 URINE PREGNANCY TEST: CPT | Performed by: ADVANCED PRACTICE MIDWIFE

## 2021-07-28 PROCEDURE — 3008F BODY MASS INDEX DOCD: CPT | Performed by: ADVANCED PRACTICE MIDWIFE

## 2021-07-28 PROCEDURE — 3078F DIAST BP <80 MM HG: CPT | Performed by: ADVANCED PRACTICE MIDWIFE

## 2021-07-28 PROCEDURE — 11981 INSERTION DRUG DLVR IMPLANT: CPT | Performed by: ADVANCED PRACTICE MIDWIFE

## 2021-07-28 PROCEDURE — 99212 OFFICE O/P EST SF 10 MIN: CPT | Performed by: ADVANCED PRACTICE MIDWIFE

## 2021-07-28 PROCEDURE — 3074F SYST BP LT 130 MM HG: CPT | Performed by: ADVANCED PRACTICE MIDWIFE

## 2021-07-28 SDOH — SOCIAL STABILITY - SOCIAL INSECURITY: OTHER SPECIFIED PROBLEMS RELATED TO PRIMARY SUPPORT GROUP: Z63.8

## 2021-07-28 NOTE — PROCEDURES
Nexplanon Insertion/Removal    Pregnancy Results: negative from urine test   Birth control method(s) used:   condoms    Consent was obtained from the patient. Insertion:    The patient was positioned with her left arm flexed.     Measurement was take

## 2021-07-28 NOTE — PROGRESS NOTES
Pt is 12 weeks PP and desires contraception  Desires Nexplanon. Pt reports that she and the FOB are having relationship issues. They are no longer living together and she is living with her mother.  Denies harm to self or infant  Denies any abuse  Denies

## 2021-07-29 LAB
C TRACH DNA SPEC QL NAA+PROBE: NEGATIVE
N GONORRHOEA DNA SPEC QL NAA+PROBE: NEGATIVE

## 2021-08-13 NOTE — ED INITIAL ASSESSMENT (HPI)
PATIENT AOX3 TO ED VIA PRIVATE VEHICLE PATIENT CO R FLANK PAIN X 2 DAYS, HX OF UTI X 1.5MO PATIENT     PATIENT PREGNANT 24 WEEKS LMP 6/1/20 G1
13-Aug-2021 13:35

## 2021-09-23 RX ORDER — IBUPROFEN 600 MG/1
TABLET ORAL
Qty: 60 TABLET | Refills: 0 | OUTPATIENT
Start: 2021-09-23

## 2022-06-01 ENCOUNTER — TELEPHONE (OUTPATIENT)
Dept: OBGYN CLINIC | Facility: CLINIC | Age: 27
End: 2022-06-01

## 2022-06-01 NOTE — TELEPHONE ENCOUNTER
Spoke to patient stated she would like the Nexplanon removed. Patient stated she has been having irregular bleeding, headache, tired and bloated since Mother's Day and no longer desires the Nexplanon. Patient stated she is not pregnant and would like to be off birth control for while. Nexplanon removal scheduled with mes on 6/7.  Patient agrees with plan and verbally understands

## 2022-06-07 ENCOUNTER — OFFICE VISIT (OUTPATIENT)
Dept: OBGYN CLINIC | Facility: CLINIC | Age: 27
End: 2022-06-07
Payer: MEDICAID

## 2022-06-07 VITALS
DIASTOLIC BLOOD PRESSURE: 70 MMHG | HEART RATE: 84 BPM | BODY MASS INDEX: 22 KG/M2 | SYSTOLIC BLOOD PRESSURE: 102 MMHG | WEIGHT: 113 LBS

## 2022-06-07 DIAGNOSIS — Z32.02 PREGNANCY EXAMINATION OR TEST, NEGATIVE RESULT: Primary | ICD-10-CM

## 2022-06-07 DIAGNOSIS — Z11.3 SCREENING EXAMINATION FOR STD (SEXUALLY TRANSMITTED DISEASE): ICD-10-CM

## 2022-06-07 LAB
CONTROL LINE PRESENT WITH A CLEAR BACKGROUND (YES/NO): YES YES/NO
PREGNANCY TEST, URINE: NEGATIVE

## 2022-06-07 PROCEDURE — 81025 URINE PREGNANCY TEST: CPT | Performed by: ADVANCED PRACTICE MIDWIFE

## 2022-06-07 PROCEDURE — 3078F DIAST BP <80 MM HG: CPT | Performed by: ADVANCED PRACTICE MIDWIFE

## 2022-06-07 PROCEDURE — 11976 REMOVE CONTRACEPTIVE CAPSULE: CPT | Performed by: ADVANCED PRACTICE MIDWIFE

## 2022-06-07 PROCEDURE — 3074F SYST BP LT 130 MM HG: CPT | Performed by: ADVANCED PRACTICE MIDWIFE

## 2022-06-07 NOTE — PROCEDURES
Nexplanon Insertion/Removal    Pregnancy Results: negative from urine test   Birth control method(s) used:   Nexplanon    Consent was obtained from the patient. Removal:  1 % lidocaine with Epinephrine was injected underneath the tip of the Nexplanon romeo that is closest to the left elbow. Nexplanon was located by palpation. 4 mm incision was made at the tip of the romeo closest to the elbow. Nexplanon was pushed toward the incision. Nexplanon romeo was sharply dissected from the tissue sheath. The entire Nexplanon romeo was removed. Visit Plan:  Steri-Strips were applied to the skin incision. An Ace bandage was wrapped around the injected arm. Both Physician and pt confirmed device by tactile feel. Patient was instructed to remove Ace bandage in 24 hours. Patient was instructed to remove Steri-Strips in 7 days. All of the patient's questions were addressed. Reviewed contraception options and pt declines at this time.   Understands the risk of pregnancy  Plan B use reviewed

## 2022-06-08 LAB
C TRACH DNA SPEC QL NAA+PROBE: NEGATIVE
N GONORRHOEA DNA SPEC QL NAA+PROBE: NEGATIVE

## 2022-09-07 ENCOUNTER — HOSPITAL ENCOUNTER (EMERGENCY)
Facility: HOSPITAL | Age: 27
Discharge: HOME | End: 2022-09-07
Payer: MEDICAID

## 2022-09-07 VITALS
TEMPERATURE: 100 F | BODY MASS INDEX: 22 KG/M2 | OXYGEN SATURATION: 99 % | WEIGHT: 112 LBS | SYSTOLIC BLOOD PRESSURE: 118 MMHG | DIASTOLIC BLOOD PRESSURE: 81 MMHG | RESPIRATION RATE: 18 BRPM | HEART RATE: 117 BPM

## 2022-09-07 DIAGNOSIS — H66.90 ACUTE OTITIS MEDIA, UNSPECIFIED OTITIS MEDIA TYPE: Primary | ICD-10-CM

## 2022-09-07 LAB
BILIRUB UR QL: NEGATIVE
COLOR UR: YELLOW
GLUCOSE UR-MCNC: NEGATIVE MG/DL
HGB UR QL STRIP.AUTO: NEGATIVE
KETONES UR-MCNC: >=160 MG/DL
NITRITE UR QL STRIP.AUTO: POSITIVE
PH UR: 7 [PH] (ref 5–8)
PROT UR-MCNC: NEGATIVE MG/DL
SARS-COV-2 RNA RESP QL NAA+PROBE: DETECTED
SP GR UR STRIP: 1.01 (ref 1–1.03)
UROBILINOGEN UR STRIP-ACNC: 0.2

## 2022-09-07 PROCEDURE — 87077 CULTURE AEROBIC IDENTIFY: CPT | Performed by: NURSE PRACTITIONER

## 2022-09-07 PROCEDURE — 81001 URINALYSIS AUTO W/SCOPE: CPT | Performed by: NURSE PRACTITIONER

## 2022-09-07 PROCEDURE — 99283 EMERGENCY DEPT VISIT LOW MDM: CPT

## 2022-09-07 PROCEDURE — 81015 MICROSCOPIC EXAM OF URINE: CPT | Performed by: NURSE PRACTITIONER

## 2022-09-07 PROCEDURE — 87186 SC STD MICRODIL/AGAR DIL: CPT | Performed by: NURSE PRACTITIONER

## 2022-09-07 PROCEDURE — 87086 URINE CULTURE/COLONY COUNT: CPT | Performed by: NURSE PRACTITIONER

## 2022-09-07 RX ORDER — IBUPROFEN 600 MG/1
600 TABLET ORAL EVERY 8 HOURS PRN
Qty: 15 TABLET | Refills: 0 | Status: SHIPPED | OUTPATIENT
Start: 2022-09-07 | End: 2022-09-14

## 2022-09-07 RX ORDER — AMOXICILLIN AND CLAVULANATE POTASSIUM 875; 125 MG/1; MG/1
1 TABLET, FILM COATED ORAL 2 TIMES DAILY
Qty: 20 TABLET | Refills: 0 | Status: SHIPPED | OUTPATIENT
Start: 2022-09-07 | End: 2022-09-17

## 2022-09-07 RX ORDER — IBUPROFEN 600 MG/1
600 TABLET ORAL ONCE
Status: COMPLETED | OUTPATIENT
Start: 2022-09-07 | End: 2022-09-07

## 2023-05-02 ENCOUNTER — OFFICE VISIT (OUTPATIENT)
Dept: OBGYN CLINIC | Facility: CLINIC | Age: 28
End: 2023-05-02

## 2023-05-02 VITALS
BODY MASS INDEX: 23.16 KG/M2 | DIASTOLIC BLOOD PRESSURE: 70 MMHG | WEIGHT: 118 LBS | SYSTOLIC BLOOD PRESSURE: 106 MMHG | HEART RATE: 94 BPM | HEIGHT: 60 IN

## 2023-05-02 DIAGNOSIS — O21.9 NAUSEA/VOMITING IN PREGNANCY: ICD-10-CM

## 2023-05-02 DIAGNOSIS — N92.6 MISSED MENSES: Primary | ICD-10-CM

## 2023-05-02 DIAGNOSIS — Z86.39 HISTORY OF HYPOTHYROIDISM: ICD-10-CM

## 2023-05-02 LAB
CONTROL LINE PRESENT WITH A CLEAR BACKGROUND (YES/NO): YES YES/NO
KIT LOT #: NORMAL NUMERIC
PREGNANCY TEST, URINE: POSITIVE

## 2023-05-02 PROCEDURE — 3008F BODY MASS INDEX DOCD: CPT | Performed by: ADVANCED PRACTICE MIDWIFE

## 2023-05-02 PROCEDURE — 3078F DIAST BP <80 MM HG: CPT | Performed by: ADVANCED PRACTICE MIDWIFE

## 2023-05-02 PROCEDURE — 3074F SYST BP LT 130 MM HG: CPT | Performed by: ADVANCED PRACTICE MIDWIFE

## 2023-05-02 PROCEDURE — 99214 OFFICE O/P EST MOD 30 MIN: CPT | Performed by: ADVANCED PRACTICE MIDWIFE

## 2023-05-02 PROCEDURE — 81025 URINE PREGNANCY TEST: CPT | Performed by: ADVANCED PRACTICE MIDWIFE

## 2023-05-02 NOTE — PATIENT INSTRUCTIONS
You may take Vitamin B6 10-25 mg 3-4 times per day. Either alone or in combination with Doxylamine 12.5 mg (unisom). Yarely capsules 250 mg 4 times daily  Accupressure bands (seabands) which you can find at [place], etc  If your nausea and vomiting is severe and this does not help please let us know so we can send in a prescription for other medications.

## 2023-06-01 ENCOUNTER — NURSE ONLY (OUTPATIENT)
Dept: OBGYN CLINIC | Facility: CLINIC | Age: 28
End: 2023-06-01

## 2023-06-01 DIAGNOSIS — Z34.81 ENCOUNTER FOR SUPERVISION OF OTHER NORMAL PREGNANCY IN FIRST TRIMESTER: Primary | ICD-10-CM

## 2023-06-01 RX ORDER — CHOLECALCIFEROL (VITAMIN D3) 25 MCG
1 TABLET,CHEWABLE ORAL DAILY
COMMUNITY

## 2023-06-01 NOTE — PROGRESS NOTES
Phone OB RN education visit completed, educational material reviewed. Pt verbalized understanding. Orders placed for NOB labs including varicella titer and hemoglobin electrophoresis. Pt states her last Pap smear was in 2020 and it was normal. Pt desires NIPT and will come to office to  kit. Pt completed EPDS, scored 4. Pt was scheduled for NOB appt. Pt desires epidural.  Pt. Has answered NO 5P questions and has NO  risk factors. Pt. Given What pregnant women need to know handout.

## 2023-06-08 ENCOUNTER — INITIAL PRENATAL (OUTPATIENT)
Dept: OBGYN CLINIC | Facility: CLINIC | Age: 28
End: 2023-06-08

## 2023-06-08 ENCOUNTER — LAB ENCOUNTER (OUTPATIENT)
Dept: LAB | Facility: HOSPITAL | Age: 28
End: 2023-06-08
Attending: ADVANCED PRACTICE MIDWIFE
Payer: MEDICAID

## 2023-06-08 VITALS
SYSTOLIC BLOOD PRESSURE: 94 MMHG | HEART RATE: 96 BPM | WEIGHT: 118 LBS | DIASTOLIC BLOOD PRESSURE: 62 MMHG | BODY MASS INDEX: 23 KG/M2

## 2023-06-08 DIAGNOSIS — Z11.3 SCREEN FOR STD (SEXUALLY TRANSMITTED DISEASE): ICD-10-CM

## 2023-06-08 DIAGNOSIS — Z34.81 ENCOUNTER FOR SUPERVISION OF OTHER NORMAL PREGNANCY IN FIRST TRIMESTER: Primary | ICD-10-CM

## 2023-06-08 DIAGNOSIS — Z34.81 ENCOUNTER FOR SUPERVISION OF OTHER NORMAL PREGNANCY IN FIRST TRIMESTER: ICD-10-CM

## 2023-06-08 PROBLEM — Z86.59 HISTORY OF POSTPARTUM DEPRESSION: Status: ACTIVE | Noted: 2021-04-02

## 2023-06-08 PROBLEM — Z87.59 HISTORY OF POSTPARTUM DEPRESSION: Status: ACTIVE | Noted: 2021-04-02

## 2023-06-08 LAB
ANTIBODY SCREEN: NEGATIVE
BASOPHILS # BLD AUTO: 0.01 X10(3) UL (ref 0–0.2)
BASOPHILS NFR BLD AUTO: 0.2 %
DEPRECATED RDW RBC AUTO: 40.6 FL (ref 35.1–46.3)
EOSINOPHIL # BLD AUTO: 0.07 X10(3) UL (ref 0–0.7)
EOSINOPHIL NFR BLD AUTO: 1.1 %
ERYTHROCYTE [DISTWIDTH] IN BLOOD BY AUTOMATED COUNT: 12.2 % (ref 11–15)
HBV SURFACE AG SER-ACNC: 0.15 [IU]/L
HBV SURFACE AG SERPL QL IA: NONREACTIVE
HCT VFR BLD AUTO: 34.1 %
HCV AB SERPL QL IA: NONREACTIVE
HGB BLD-MCNC: 11.7 G/DL
IMM GRANULOCYTES # BLD AUTO: 0.02 X10(3) UL (ref 0–1)
IMM GRANULOCYTES NFR BLD: 0.3 %
LYMPHOCYTES # BLD AUTO: 1.47 X10(3) UL (ref 1–4)
LYMPHOCYTES NFR BLD AUTO: 24 %
MCH RBC QN AUTO: 31.3 PG (ref 26–34)
MCHC RBC AUTO-ENTMCNC: 34.3 G/DL (ref 31–37)
MCV RBC AUTO: 91.2 FL
MONOCYTES # BLD AUTO: 0.41 X10(3) UL (ref 0.1–1)
MONOCYTES NFR BLD AUTO: 6.7 %
NEUTROPHILS # BLD AUTO: 4.14 X10 (3) UL (ref 1.5–7.7)
NEUTROPHILS # BLD AUTO: 4.14 X10(3) UL (ref 1.5–7.7)
NEUTROPHILS NFR BLD AUTO: 67.7 %
PLATELET # BLD AUTO: 231 10(3)UL (ref 150–450)
RBC # BLD AUTO: 3.74 X10(6)UL
RH BLOOD TYPE: POSITIVE
RUBV IGG SER QL: POSITIVE
RUBV IGG SER-ACNC: 265.5 IU/ML (ref 10–?)
TSI SER-ACNC: 4.29 MIU/ML (ref 0.36–3.74)
WBC # BLD AUTO: 6.1 X10(3) UL (ref 4–11)

## 2023-06-08 PROCEDURE — 87088 URINE BACTERIA CULTURE: CPT

## 2023-06-08 PROCEDURE — 3078F DIAST BP <80 MM HG: CPT | Performed by: ADVANCED PRACTICE MIDWIFE

## 2023-06-08 PROCEDURE — 87086 URINE CULTURE/COLONY COUNT: CPT

## 2023-06-08 PROCEDURE — 36415 COLL VENOUS BLD VENIPUNCTURE: CPT

## 2023-06-08 PROCEDURE — 86901 BLOOD TYPING SEROLOGIC RH(D): CPT

## 2023-06-08 PROCEDURE — 86780 TREPONEMA PALLIDUM: CPT

## 2023-06-08 PROCEDURE — 86850 RBC ANTIBODY SCREEN: CPT

## 2023-06-08 PROCEDURE — 83020 HEMOGLOBIN ELECTROPHORESIS: CPT

## 2023-06-08 PROCEDURE — 85025 COMPLETE CBC W/AUTO DIFF WBC: CPT

## 2023-06-08 PROCEDURE — 3074F SYST BP LT 130 MM HG: CPT | Performed by: ADVANCED PRACTICE MIDWIFE

## 2023-06-08 PROCEDURE — 87389 HIV-1 AG W/HIV-1&-2 AB AG IA: CPT

## 2023-06-08 PROCEDURE — 87186 SC STD MICRODIL/AGAR DIL: CPT

## 2023-06-08 PROCEDURE — 83021 HEMOGLOBIN CHROMOTOGRAPHY: CPT

## 2023-06-08 PROCEDURE — 86787 VARICELLA-ZOSTER ANTIBODY: CPT

## 2023-06-08 PROCEDURE — 86762 RUBELLA ANTIBODY: CPT

## 2023-06-08 PROCEDURE — 0500F INITIAL PRENATAL CARE VISIT: CPT | Performed by: ADVANCED PRACTICE MIDWIFE

## 2023-06-08 PROCEDURE — 87340 HEPATITIS B SURFACE AG IA: CPT

## 2023-06-08 PROCEDURE — 86803 HEPATITIS C AB TEST: CPT

## 2023-06-08 PROCEDURE — 84443 ASSAY THYROID STIM HORMONE: CPT

## 2023-06-08 PROCEDURE — 86900 BLOOD TYPING SEROLOGIC ABO: CPT

## 2023-06-08 NOTE — PROGRESS NOTES
Trevon Beck presents for NOB visit. Denies vaginal bleeding or pelvic pain. Reports anxiety and depression are stable. Had hypothyroidism in last pregnancy. No other complications with previous pregnancy or delivery. Denies gHTN, GDM, PPH, shoulder dystocia. Has not completed prenatal labs which includes thyroid screen. Instructed to do so after visit. Has kit for NIPTs. Physical exam WNL. Pap up to date. Reviewed warning signs and when to call.  ALICE 4wks

## 2023-06-09 ENCOUNTER — TELEPHONE (OUTPATIENT)
Dept: OBGYN CLINIC | Facility: CLINIC | Age: 28
End: 2023-06-09

## 2023-06-09 DIAGNOSIS — E03.9 HYPOTHYROIDISM DURING PREGNANCY, ANTEPARTUM: Primary | ICD-10-CM

## 2023-06-09 DIAGNOSIS — O99.280 HYPOTHYROIDISM DURING PREGNANCY, ANTEPARTUM: Primary | ICD-10-CM

## 2023-06-09 PROBLEM — O23.41 URINARY TRACT INFECTION IN MOTHER DURING FIRST TRIMESTER OF PREGNANCY: Status: ACTIVE | Noted: 2023-06-09

## 2023-06-09 PROBLEM — O23.41 URINARY TRACT INFECTION IN MOTHER DURING FIRST TRIMESTER OF PREGNANCY (HCC): Status: ACTIVE | Noted: 2023-06-09

## 2023-06-09 LAB
C TRACH DNA SPEC QL NAA+PROBE: NEGATIVE
N GONORRHOEA DNA SPEC QL NAA+PROBE: NEGATIVE
T PALLIDUM AB SER QL: NEGATIVE
VZV IGG SER IA-ACNC: 733.8 (ref 165–?)

## 2023-06-09 RX ORDER — AMOXICILLIN 250 MG/1
250 CAPSULE ORAL 3 TIMES DAILY
Qty: 30 CAPSULE | Refills: 0 | Status: SHIPPED | OUTPATIENT
Start: 2023-06-09 | End: 2023-06-19

## 2023-06-14 ENCOUNTER — TELEPHONE (OUTPATIENT)
Dept: OBGYN CLINIC | Facility: CLINIC | Age: 28
End: 2023-06-14

## 2023-06-14 LAB
HGB A2 MFR BLD: 2.7 % (ref 1.5–3.5)
HGB PNL BLD ELPH: 97.3 % (ref 95.5–100)

## 2023-06-14 RX ORDER — SULFAMETHOXAZOLE AND TRIMETHOPRIM 800; 160 MG/1; MG/1
1 TABLET ORAL 2 TIMES DAILY
Qty: 20 TABLET | Refills: 0 | Status: SHIPPED | OUTPATIENT
Start: 2023-06-14 | End: 2023-06-24

## 2023-06-20 ENCOUNTER — TELEPHONE (OUTPATIENT)
Dept: OBGYN CLINIC | Facility: CLINIC | Age: 28
End: 2023-06-20

## 2023-06-20 NOTE — TELEPHONE ENCOUNTER
----- Message from Marie Jhaveri CNM sent at 6/14/2023  6:44 PM CDT -----  [facility] and switched rx to Bactrim   I sent in the rx please call and confirm with patient Stop ampicillin and start Bactrim as ordered

## 2023-07-11 ENCOUNTER — LAB ENCOUNTER (OUTPATIENT)
Dept: LAB | Facility: HOSPITAL | Age: 28
End: 2023-07-11
Attending: ADVANCED PRACTICE MIDWIFE
Payer: MEDICAID

## 2023-07-11 ENCOUNTER — ROUTINE PRENATAL (OUTPATIENT)
Dept: OBGYN CLINIC | Facility: CLINIC | Age: 28
End: 2023-07-11

## 2023-07-11 VITALS
DIASTOLIC BLOOD PRESSURE: 75 MMHG | WEIGHT: 122 LBS | HEART RATE: 112 BPM | SYSTOLIC BLOOD PRESSURE: 113 MMHG | BODY MASS INDEX: 24 KG/M2

## 2023-07-11 DIAGNOSIS — Z34.82 ENCOUNTER FOR SUPERVISION OF OTHER NORMAL PREGNANCY IN SECOND TRIMESTER: Primary | ICD-10-CM

## 2023-07-11 DIAGNOSIS — E03.9 HYPOTHYROIDISM DURING PREGNANCY IN SECOND TRIMESTER: ICD-10-CM

## 2023-07-11 DIAGNOSIS — R79.89 ABNORMAL TSH: ICD-10-CM

## 2023-07-11 DIAGNOSIS — O99.282 HYPOTHYROIDISM DURING PREGNANCY IN SECOND TRIMESTER: ICD-10-CM

## 2023-07-11 DIAGNOSIS — O23.42 URINARY TRACT INFECTION IN MOTHER DURING SECOND TRIMESTER OF PREGNANCY: ICD-10-CM

## 2023-07-11 LAB
T4 FREE SERPL-MCNC: 1.2 NG/DL (ref 0.8–1.7)
TSI SER-ACNC: 2.97 MIU/ML (ref 0.36–3.74)

## 2023-07-11 PROCEDURE — 84443 ASSAY THYROID STIM HORMONE: CPT

## 2023-07-11 PROCEDURE — 36415 COLL VENOUS BLD VENIPUNCTURE: CPT

## 2023-07-11 PROCEDURE — 3078F DIAST BP <80 MM HG: CPT | Performed by: ADVANCED PRACTICE MIDWIFE

## 2023-07-11 PROCEDURE — 84439 ASSAY OF FREE THYROXINE: CPT

## 2023-07-11 PROCEDURE — 3074F SYST BP LT 130 MM HG: CPT | Performed by: ADVANCED PRACTICE MIDWIFE

## 2023-07-11 PROCEDURE — 0502F SUBSEQUENT PRENATAL CARE: CPT | Performed by: ADVANCED PRACTICE MIDWIFE

## 2023-07-11 PROCEDURE — 82105 ALPHA-FETOPROTEIN SERUM: CPT

## 2023-07-11 NOTE — PROGRESS NOTES
+ FM. Denies cramping, bleeding or LOF. AFP ordered. Had elevated TSH however has not f/u with Endo yet. She had hypothyroidism on meds in last pregnancy. Discussed importance of f/u as can have effects on cognition of baby. She will make appt. Repeat TSH/Free T4 today. Took antibiotics for UTI however stopped as felt better. That was 1 mn ago. Stressed importance of finishing rx to prevent recurrence and antibiotic resistance. As has been almost a month now will repeat urine cx and await results prior to further tx. Warning signs reviewed. Did not receive results of CFDNA- noted in chart low risk, however gender not there. Results sent to scan but not scanned yet. Will have RN call [employer] and get re-faxed. Warning signs reviewed. TO schedule 20 wk ultrasound.

## 2023-07-12 ENCOUNTER — TELEPHONE (OUTPATIENT)
Dept: PERINATAL CARE | Facility: HOSPITAL | Age: 28
End: 2023-07-12

## 2023-07-12 LAB
AFP MOM: 0.63
AFP VALUE: 30.1 NG/ML
GA ON COLL DATE: 17.4 WEEKS
INSULIN DEP AFP: NO
MAT AGE AT EDD: 27.1 YR
MULTIPLE GEST AFP: NO
OSBR RISK 1 IN AFP: NORMAL
WEIGHT AFP: 122 LBS

## 2023-07-12 RX ORDER — CEPHALEXIN 500 MG/1
500 CAPSULE ORAL 4 TIMES DAILY
Qty: 28 CAPSULE | Refills: 0 | Status: SHIPPED | OUTPATIENT
Start: 2023-07-12 | End: 2023-07-19

## 2023-07-12 NOTE — TELEPHONE ENCOUNTER
Returned pt call RE scheduling.   No answer  Left Voice mail to call back with [facility] number  [phone]

## 2023-07-13 ENCOUNTER — TELEPHONE (OUTPATIENT)
Dept: OBGYN CLINIC | Facility: CLINIC | Age: 28
End: 2023-07-13

## 2023-07-13 RX ORDER — NITROFURANTOIN 25; 75 MG/1; MG/1
100 CAPSULE ORAL 2 TIMES DAILY
Qty: 14 CAPSULE | Refills: 0 | Status: SHIPPED | OUTPATIENT
Start: 2023-07-13 | End: 2023-07-20

## 2023-07-13 NOTE — TELEPHONE ENCOUNTER
----- Message from Alison Espinoza CNM sent at 7/13/2023 10:54 AM CDT -----  Please notify pt that the results of sensitivities on urine culture came back and is resistant to prior Rx antibiotics that sent in. I have sent if a different antibiotic- nitrofurantoin, which it was sensitive to. She should stop other meds and start this new med. It is very important that she finishes the prescription even if feeling better as this is becoming more resistant when not finishing the course of antibiotics.  Thanks MJ

## 2023-07-13 NOTE — TELEPHONE ENCOUNTER
This RN spoke to patient. Gave patient culture results and provided education on importance of discontinuing current antibiotic and starting new one. Pt verbalized understanding and agrees with care plan.

## 2023-07-13 NOTE — TELEPHONE ENCOUNTER
----- Message from Maye Munoz CNM sent at 7/12/2023  2:32 PM CDT -----  Please let pt know that UTI still present and have sent in Rx. She should finish entire antibiotic even if symptoms resolve. Also repeat TS/T4 levels are normal so we can continue to monitor for now.  Also we received her Louvenia August results and \"its a girl\"

## 2023-07-24 ENCOUNTER — TELEPHONE (OUTPATIENT)
Dept: OBGYN CLINIC | Facility: CLINIC | Age: 28
End: 2023-07-24

## 2023-07-24 NOTE — TELEPHONE ENCOUNTER
Yvonne Koroma from [facility] [employer], said pt is in a lot of pain, needs to take xrays and any possible treatment, needs medical clearence.  Fax [phone]

## 2023-07-26 ENCOUNTER — TELEPHONE (OUTPATIENT)
Dept: OBGYN CLINIC | Facility: CLINIC | Age: 28
End: 2023-07-26

## 2023-07-26 NOTE — TELEPHONE ENCOUNTER
Pt states she is having molar pain and woke up with a swollen eye and cheek, states she called the dentist and was told she has to follow up with midwife first if ok for her to be seen.  Please advise

## 2023-07-26 NOTE — TELEPHONE ENCOUNTER
Pt states dentist needs new letter for medical clearance. Letter refaxed. Pt agreed and voiced understanding.

## 2023-07-26 NOTE — TELEPHONE ENCOUNTER
Phone call to patient. Informed her that amoxicillin is safe to take in pregnancy. She is inquired if she can stop the Keflex. She states she has been taking it for a week and a half but is only taking 2 pills daily instead of the 4 prescribed as she does not eat that often and it makes her sick. Discussed importance of taking antibiotics as prescribed as otherwise will not effectively treat infection. She states she did the same thing when she had the UTI in June. Upon further review of chart it is noted that antibiotics were changed from keflex to macrobid due to resistance. Patient states she did finish macrobid. Informed her that she can stop keflex. She is to take amoxicillin as prescribed by dentist. States understanding.

## 2023-07-26 NOTE — TELEPHONE ENCOUNTER
Pt went to dentist today left cheek & eye swollen was going to do root canal but to swollen. Dentist gave her Amoxicillin 500 mg. Pt already taking Cephalexin 500 mg. Pt 19 wks pregnant.  Dentist told pt to call to make sure she is ok to take both medications

## 2023-08-04 ENCOUNTER — HOSPITAL ENCOUNTER (OUTPATIENT)
Dept: PERINATAL CARE | Facility: HOSPITAL | Age: 28
Discharge: HOME | End: 2023-08-04
Attending: OBSTETRICS & GYNECOLOGY
Payer: MEDICAID

## 2023-08-04 ENCOUNTER — HOSPITAL ENCOUNTER (OUTPATIENT)
Dept: PERINATAL CARE | Facility: HOSPITAL | Age: 28
Discharge: HOME | End: 2023-08-04
Attending: ADVANCED PRACTICE MIDWIFE
Payer: MEDICAID

## 2023-08-04 VITALS
SYSTOLIC BLOOD PRESSURE: 112 MMHG | DIASTOLIC BLOOD PRESSURE: 77 MMHG | WEIGHT: 127 LBS | BODY MASS INDEX: 25 KG/M2 | HEART RATE: 102 BPM

## 2023-08-04 DIAGNOSIS — E03.9 HYPOTHYROIDISM DURING PREGNANCY IN SECOND TRIMESTER: Primary | ICD-10-CM

## 2023-08-04 DIAGNOSIS — O99.282 HYPOTHYROIDISM DURING PREGNANCY IN SECOND TRIMESTER: Primary | ICD-10-CM

## 2023-08-04 DIAGNOSIS — O99.282 HYPOTHYROIDISM DURING PREGNANCY IN SECOND TRIMESTER: ICD-10-CM

## 2023-08-04 DIAGNOSIS — E03.9 HYPOTHYROIDISM DURING PREGNANCY IN SECOND TRIMESTER: ICD-10-CM

## 2023-08-04 PROCEDURE — 76811 OB US DETAILED SNGL FETUS: CPT | Performed by: OBSTETRICS & GYNECOLOGY

## 2023-08-14 ENCOUNTER — ROUTINE PRENATAL (OUTPATIENT)
Dept: OBGYN CLINIC | Facility: CLINIC | Age: 28
End: 2023-08-14

## 2023-08-14 ENCOUNTER — LAB ENCOUNTER (OUTPATIENT)
Dept: LAB | Facility: HOSPITAL | Age: 28
End: 2023-08-14
Attending: ADVANCED PRACTICE MIDWIFE
Payer: MEDICAID

## 2023-08-14 VITALS
SYSTOLIC BLOOD PRESSURE: 97 MMHG | DIASTOLIC BLOOD PRESSURE: 63 MMHG | BODY MASS INDEX: 25 KG/M2 | HEART RATE: 85 BPM | WEIGHT: 126.5 LBS

## 2023-08-14 DIAGNOSIS — N89.8 VAGINAL DISCHARGE DURING PREGNANCY IN THIRD TRIMESTER: ICD-10-CM

## 2023-08-14 DIAGNOSIS — Z34.82 ENCOUNTER FOR SUPERVISION OF OTHER NORMAL PREGNANCY IN SECOND TRIMESTER: Primary | ICD-10-CM

## 2023-08-14 DIAGNOSIS — O99.282 HYPOTHYROIDISM DURING PREGNANCY IN SECOND TRIMESTER: ICD-10-CM

## 2023-08-14 DIAGNOSIS — E03.9 HYPOTHYROIDISM DURING PREGNANCY IN SECOND TRIMESTER: ICD-10-CM

## 2023-08-14 DIAGNOSIS — Z34.82 ENCOUNTER FOR SUPERVISION OF OTHER NORMAL PREGNANCY IN SECOND TRIMESTER: ICD-10-CM

## 2023-08-14 DIAGNOSIS — Z11.3 SCREEN FOR STD (SEXUALLY TRANSMITTED DISEASE): ICD-10-CM

## 2023-08-14 DIAGNOSIS — O26.893 VAGINAL DISCHARGE DURING PREGNANCY IN THIRD TRIMESTER: ICD-10-CM

## 2023-08-14 DIAGNOSIS — O23.42 URINARY TRACT INFECTION IN MOTHER DURING SECOND TRIMESTER OF PREGNANCY: ICD-10-CM

## 2023-08-14 LAB
T4 FREE SERPL-MCNC: 1.2 NG/DL (ref 0.8–1.7)
TRICHOMONAS SCREEN: NEGATIVE
TSI SER-ACNC: 3.85 MIU/ML (ref 0.36–3.74)

## 2023-08-14 PROCEDURE — 87106 FUNGI IDENTIFICATION YEAST: CPT | Performed by: ADVANCED PRACTICE MIDWIFE

## 2023-08-14 PROCEDURE — 84443 ASSAY THYROID STIM HORMONE: CPT

## 2023-08-14 PROCEDURE — 87591 N.GONORRHOEAE DNA AMP PROB: CPT | Performed by: ADVANCED PRACTICE MIDWIFE

## 2023-08-14 PROCEDURE — 36415 COLL VENOUS BLD VENIPUNCTURE: CPT

## 2023-08-14 PROCEDURE — 87205 SMEAR GRAM STAIN: CPT | Performed by: ADVANCED PRACTICE MIDWIFE

## 2023-08-14 PROCEDURE — 87491 CHLMYD TRACH DNA AMP PROBE: CPT | Performed by: ADVANCED PRACTICE MIDWIFE

## 2023-08-14 PROCEDURE — 84439 ASSAY OF FREE THYROXINE: CPT

## 2023-08-14 PROCEDURE — 87086 URINE CULTURE/COLONY COUNT: CPT | Performed by: ADVANCED PRACTICE MIDWIFE

## 2023-08-14 PROCEDURE — 87808 TRICHOMONAS ASSAY W/OPTIC: CPT | Performed by: ADVANCED PRACTICE MIDWIFE

## 2023-08-14 NOTE — PROGRESS NOTES
Active fetus Denies any vaginal bleeding, leaking of fluid or vaginal discharge. No signs signs of PTL. Reviewed S&S of PTL  Warning signs reviewed  All questions answered. Vaginal discharge and irritation - spec exam thick white dc no odor - Vaginosis screen - probable yeast rx  Hx UTI - DENYS sent  Hx hypothyroid- not taking meds - TSH / T4 today .   Has appt with endo next week

## 2023-08-15 LAB
C TRACH DNA SPEC QL NAA+PROBE: NEGATIVE
N GONORRHOEA DNA SPEC QL NAA+PROBE: NEGATIVE

## 2023-08-16 ENCOUNTER — TELEPHONE (OUTPATIENT)
Dept: OBGYN CLINIC | Facility: CLINIC | Age: 28
End: 2023-08-16

## 2023-08-16 LAB
GENITAL VAGINOSIS SCREEN: NEGATIVE
TRICHOMONAS SCREEN: NEGATIVE

## 2023-08-16 NOTE — TELEPHONE ENCOUNTER
----- Message from Clara Arnold CNM sent at 8/16/2023  8:00 AM CDT -----  TSH is abnormal please confirm that she has an appointment with rachel  Also is she taking her thyroid medication as prescribed.   If she has questions have her speak with the midwife on call

## 2023-08-16 NOTE — TELEPHONE ENCOUNTER
Lmtcb      ----- Message from Sakina Bolden CNM sent at 8/16/2023  8:00 AM CDT -----  TSH is abnormal please confirm that she has an appointment with rachel  Also is she taking her thyroid medication as prescribed.   If she has questions have her speak with the midwife on call     Urine culture WNL

## 2023-08-16 NOTE — TELEPHONE ENCOUNTER
----- Message from Geremias Amaro CNM sent at 8/16/2023  8:00 AM CDT -----  TSH is abnormal please confirm that she has an appointment with rachel  Also is she taking her thyroid medication as prescribed.   If she has questions have her speak with the midwife on call

## 2023-08-21 ENCOUNTER — OFFICE VISIT (OUTPATIENT)
Dept: ENDOCRINOLOGY CLINIC | Facility: CLINIC | Age: 28
End: 2023-08-21

## 2023-08-21 VITALS
SYSTOLIC BLOOD PRESSURE: 104 MMHG | BODY MASS INDEX: 25.26 KG/M2 | DIASTOLIC BLOOD PRESSURE: 70 MMHG | WEIGHT: 128.63 LBS | HEART RATE: 83 BPM | HEIGHT: 60 IN

## 2023-08-21 DIAGNOSIS — O99.280 HYPOTHYROIDISM AFFECTING PREGNANCY, ANTEPARTUM: Primary | ICD-10-CM

## 2023-08-21 DIAGNOSIS — E03.9 HYPOTHYROIDISM AFFECTING PREGNANCY, ANTEPARTUM: Primary | ICD-10-CM

## 2023-08-21 PROCEDURE — 3008F BODY MASS INDEX DOCD: CPT | Performed by: INTERNAL MEDICINE

## 2023-08-21 PROCEDURE — 99214 OFFICE O/P EST MOD 30 MIN: CPT | Performed by: INTERNAL MEDICINE

## 2023-08-21 PROCEDURE — 3074F SYST BP LT 130 MM HG: CPT | Performed by: INTERNAL MEDICINE

## 2023-08-21 PROCEDURE — 3078F DIAST BP <80 MM HG: CPT | Performed by: INTERNAL MEDICINE

## 2023-08-21 RX ORDER — LEVOTHYROXINE SODIUM 0.03 MG/1
25 TABLET ORAL
Qty: 90 TABLET | Refills: 0 | Status: SHIPPED | OUTPATIENT
Start: 2023-08-21 | End: 2023-11-19

## 2023-09-18 ENCOUNTER — ROUTINE PRENATAL (OUTPATIENT)
Dept: OBGYN CLINIC | Facility: CLINIC | Age: 28
End: 2023-09-18

## 2023-09-18 VITALS
SYSTOLIC BLOOD PRESSURE: 108 MMHG | BODY MASS INDEX: 26 KG/M2 | HEART RATE: 93 BPM | DIASTOLIC BLOOD PRESSURE: 73 MMHG | WEIGHT: 133 LBS

## 2023-09-18 DIAGNOSIS — O26.899 PELVIC PAIN IN PREGNANCY: ICD-10-CM

## 2023-09-18 DIAGNOSIS — Z34.82 ENCOUNTER FOR SUPERVISION OF OTHER NORMAL PREGNANCY IN SECOND TRIMESTER: Primary | ICD-10-CM

## 2023-09-18 DIAGNOSIS — M54.9 BACK PAIN IN PREGNANCY: ICD-10-CM

## 2023-09-18 DIAGNOSIS — O99.891 BACK PAIN IN PREGNANCY: ICD-10-CM

## 2023-09-18 DIAGNOSIS — R10.2 PELVIC PAIN IN PREGNANCY: ICD-10-CM

## 2023-09-18 PROCEDURE — 3074F SYST BP LT 130 MM HG: CPT | Performed by: ADVANCED PRACTICE MIDWIFE

## 2023-09-18 PROCEDURE — 3078F DIAST BP <80 MM HG: CPT | Performed by: ADVANCED PRACTICE MIDWIFE

## 2023-09-18 PROCEDURE — 0502F SUBSEQUENT PRENATAL CARE: CPT | Performed by: ADVANCED PRACTICE MIDWIFE

## 2023-10-02 ENCOUNTER — ROUTINE PRENATAL (OUTPATIENT)
Dept: OBGYN CLINIC | Facility: CLINIC | Age: 28
End: 2023-10-02

## 2023-10-02 VITALS — SYSTOLIC BLOOD PRESSURE: 116 MMHG | DIASTOLIC BLOOD PRESSURE: 77 MMHG | BODY MASS INDEX: 26 KG/M2 | WEIGHT: 135 LBS

## 2023-10-02 DIAGNOSIS — Z34.83 ENCOUNTER FOR SUPERVISION OF OTHER NORMAL PREGNANCY IN THIRD TRIMESTER: Primary | ICD-10-CM

## 2023-10-02 PROBLEM — Z63.9 FAMILY TENSION: Status: RESOLVED | Noted: 2021-04-14 | Resolved: 2023-10-02

## 2023-10-02 PROCEDURE — 90686 IIV4 VACC NO PRSV 0.5 ML IM: CPT | Performed by: ADVANCED PRACTICE MIDWIFE

## 2023-10-02 PROCEDURE — 3074F SYST BP LT 130 MM HG: CPT | Performed by: ADVANCED PRACTICE MIDWIFE

## 2023-10-02 PROCEDURE — 3078F DIAST BP <80 MM HG: CPT | Performed by: ADVANCED PRACTICE MIDWIFE

## 2023-10-02 PROCEDURE — 90472 IMMUNIZATION ADMIN EACH ADD: CPT | Performed by: ADVANCED PRACTICE MIDWIFE

## 2023-10-02 PROCEDURE — 90715 TDAP VACCINE 7 YRS/> IM: CPT | Performed by: ADVANCED PRACTICE MIDWIFE

## 2023-10-02 PROCEDURE — 0502F SUBSEQUENT PRENATAL CARE: CPT | Performed by: ADVANCED PRACTICE MIDWIFE

## 2023-10-02 PROCEDURE — 90471 IMMUNIZATION ADMIN: CPT | Performed by: ADVANCED PRACTICE MIDWIFE

## 2023-10-02 NOTE — PROGRESS NOTES
Here for ALICE visit.  Patient's last menstrual period was 2023 (exact date). 2023, by Last Menstrual Period 29w2d     Baby active. Denies contractions, LOF or bleeding    Labs: Going later this week  Tdap: Per nurse today. Flu today as well. Fetal kick counts, warning signs and signs PTL reviewed. 28 wk pack given & reviewed.

## 2023-10-09 ENCOUNTER — LAB ENCOUNTER (OUTPATIENT)
Dept: LAB | Facility: HOSPITAL | Age: 28
End: 2023-10-09
Attending: ADVANCED PRACTICE MIDWIFE
Payer: MEDICAID

## 2023-10-09 DIAGNOSIS — Z34.82 ENCOUNTER FOR SUPERVISION OF OTHER NORMAL PREGNANCY IN SECOND TRIMESTER: ICD-10-CM

## 2023-10-09 DIAGNOSIS — O99.280 HYPOTHYROIDISM AFFECTING PREGNANCY, ANTEPARTUM: ICD-10-CM

## 2023-10-09 DIAGNOSIS — E03.9 HYPOTHYROIDISM AFFECTING PREGNANCY, ANTEPARTUM: ICD-10-CM

## 2023-10-09 LAB
BASOPHILS # BLD AUTO: 0.01 X10(3) UL (ref 0–0.2)
BASOPHILS NFR BLD AUTO: 0.1 %
DEPRECATED RDW RBC AUTO: 40.5 FL (ref 35.1–46.3)
EOSINOPHIL # BLD AUTO: 0.07 X10(3) UL (ref 0–0.7)
EOSINOPHIL NFR BLD AUTO: 1 %
ERYTHROCYTE [DISTWIDTH] IN BLOOD BY AUTOMATED COUNT: 11.9 % (ref 11–15)
GLUCOSE 1H P GLC SERPL-MCNC: 100 MG/DL
HCT VFR BLD AUTO: 31.1 %
HGB BLD-MCNC: 10.5 G/DL
IMM GRANULOCYTES # BLD AUTO: 0.04 X10(3) UL (ref 0–1)
IMM GRANULOCYTES NFR BLD: 0.6 %
LYMPHOCYTES # BLD AUTO: 1.12 X10(3) UL (ref 1–4)
LYMPHOCYTES NFR BLD AUTO: 16.2 %
MCH RBC QN AUTO: 31.3 PG (ref 26–34)
MCHC RBC AUTO-ENTMCNC: 33.8 G/DL (ref 31–37)
MCV RBC AUTO: 92.6 FL
MONOCYTES # BLD AUTO: 0.41 X10(3) UL (ref 0.1–1)
MONOCYTES NFR BLD AUTO: 5.9 %
NEUTROPHILS # BLD AUTO: 5.28 X10 (3) UL (ref 1.5–7.7)
NEUTROPHILS # BLD AUTO: 5.28 X10(3) UL (ref 1.5–7.7)
NEUTROPHILS NFR BLD AUTO: 76.2 %
PLATELET # BLD AUTO: 212 10(3)UL (ref 150–450)
RBC # BLD AUTO: 3.36 X10(6)UL
T4 FREE SERPL-MCNC: 1.1 NG/DL (ref 0.8–1.7)
TSI SER-ACNC: 2.66 MIU/ML (ref 0.36–3.74)
WBC # BLD AUTO: 6.9 X10(3) UL (ref 4–11)

## 2023-10-09 PROCEDURE — 84443 ASSAY THYROID STIM HORMONE: CPT

## 2023-10-09 PROCEDURE — 85025 COMPLETE CBC W/AUTO DIFF WBC: CPT

## 2023-10-09 PROCEDURE — 86780 TREPONEMA PALLIDUM: CPT

## 2023-10-09 PROCEDURE — 84439 ASSAY OF FREE THYROXINE: CPT

## 2023-10-09 PROCEDURE — 87389 HIV-1 AG W/HIV-1&-2 AB AG IA: CPT

## 2023-10-09 PROCEDURE — 82950 GLUCOSE TEST: CPT

## 2023-10-09 PROCEDURE — 36415 COLL VENOUS BLD VENIPUNCTURE: CPT

## 2023-10-10 ENCOUNTER — TELEPHONE (OUTPATIENT)
Dept: OBGYN CLINIC | Facility: CLINIC | Age: 28
End: 2023-10-10

## 2023-10-10 NOTE — TELEPHONE ENCOUNTER
----- Message from Rush Jones CNM sent at 10/10/2023  2:58 PM CDT -----  Labs normal except slight anemia  Start 325mg iron every other day

## 2023-10-11 LAB — T PALLIDUM AB SER QL: NEGATIVE

## 2023-10-23 ENCOUNTER — APPOINTMENT (OUTPATIENT)
Dept: PERINATAL CARE | Facility: HOSPITAL | Age: 28
End: 2023-10-23
Attending: OBSTETRICS & GYNECOLOGY

## 2023-10-23 ENCOUNTER — HOSPITAL ENCOUNTER (OUTPATIENT)
Dept: PERINATAL CARE | Facility: HOSPITAL | Age: 28
Discharge: HOME | End: 2023-10-23
Attending: OBSTETRICS & GYNECOLOGY

## 2023-10-23 DIAGNOSIS — O99.280 HYPOTHYROIDISM AFFECTING PREGNANCY, ANTEPARTUM: Primary | ICD-10-CM

## 2023-10-23 DIAGNOSIS — O99.280 HYPOTHYROIDISM AFFECTING PREGNANCY, ANTEPARTUM: ICD-10-CM

## 2023-10-23 DIAGNOSIS — E03.9 HYPOTHYROIDISM AFFECTING PREGNANCY, ANTEPARTUM: Primary | ICD-10-CM

## 2023-10-23 DIAGNOSIS — E03.9 HYPOTHYROIDISM AFFECTING PREGNANCY, ANTEPARTUM: ICD-10-CM

## 2023-10-24 ENCOUNTER — ROUTINE PRENATAL (OUTPATIENT)
Dept: OBGYN CLINIC | Facility: CLINIC | Age: 28
End: 2023-10-24

## 2023-10-24 VITALS — BODY MASS INDEX: 27 KG/M2 | WEIGHT: 140.19 LBS | SYSTOLIC BLOOD PRESSURE: 111 MMHG | DIASTOLIC BLOOD PRESSURE: 70 MMHG

## 2023-10-24 DIAGNOSIS — Z34.83 ENCOUNTER FOR SUPERVISION OF OTHER NORMAL PREGNANCY IN THIRD TRIMESTER: Primary | ICD-10-CM

## 2023-10-24 PROBLEM — Z86.39 HISTORY OF HYPOTHYROIDISM: Status: RESOLVED | Noted: 2023-05-02 | Resolved: 2023-10-24

## 2023-10-24 PROCEDURE — 3074F SYST BP LT 130 MM HG: CPT | Performed by: ADVANCED PRACTICE MIDWIFE

## 2023-10-24 PROCEDURE — 99215 OFFICE O/P EST HI 40 MIN: CPT | Performed by: ADVANCED PRACTICE MIDWIFE

## 2023-10-24 PROCEDURE — 3078F DIAST BP <80 MM HG: CPT | Performed by: ADVANCED PRACTICE MIDWIFE

## 2023-10-24 NOTE — PROGRESS NOTES
Here for ALICE visit.  Patient's last menstrual period was 2023 (exact date). 2023, by Last Menstrual Period 32w3d     Baby active. Denies contractions, LOF or bleeding    Labs: Anemia- taking PO iron  Ultrasound: Has growth ultrasound tomorrow  Tdap: Completed, Flu vaccine completed    Fetal kick counts, warning signs and signs PTL reviewed.

## 2023-10-25 ENCOUNTER — HOSPITAL ENCOUNTER (OUTPATIENT)
Dept: PERINATAL CARE | Facility: HOSPITAL | Age: 28
Discharge: HOME | End: 2023-10-25
Attending: OBSTETRICS & GYNECOLOGY

## 2023-10-25 VITALS
HEART RATE: 116 BPM | BODY MASS INDEX: 27 KG/M2 | SYSTOLIC BLOOD PRESSURE: 95 MMHG | WEIGHT: 140 LBS | DIASTOLIC BLOOD PRESSURE: 61 MMHG

## 2023-10-25 DIAGNOSIS — O99.280 HYPOTHYROIDISM AFFECTING PREGNANCY, ANTEPARTUM: ICD-10-CM

## 2023-10-25 DIAGNOSIS — O99.280 HYPOTHYROIDISM AFFECTING PREGNANCY, ANTEPARTUM: Primary | ICD-10-CM

## 2023-10-25 DIAGNOSIS — E03.9 HYPOTHYROIDISM AFFECTING PREGNANCY, ANTEPARTUM: ICD-10-CM

## 2023-10-25 DIAGNOSIS — E03.9 HYPOTHYROIDISM AFFECTING PREGNANCY, ANTEPARTUM: Primary | ICD-10-CM

## 2023-10-25 PROCEDURE — 76819 FETAL BIOPHYS PROFIL W/O NST: CPT

## 2023-10-25 PROCEDURE — 76816 OB US FOLLOW-UP PER FETUS: CPT | Performed by: OBSTETRICS & GYNECOLOGY

## 2023-11-06 ENCOUNTER — ROUTINE PRENATAL (OUTPATIENT)
Dept: OBGYN CLINIC | Facility: CLINIC | Age: 28
End: 2023-11-06

## 2023-11-06 VITALS
WEIGHT: 142.25 LBS | BODY MASS INDEX: 28 KG/M2 | HEART RATE: 91 BPM | SYSTOLIC BLOOD PRESSURE: 114 MMHG | DIASTOLIC BLOOD PRESSURE: 73 MMHG

## 2023-11-06 DIAGNOSIS — O36.5990 PREGNANCY AFFECTED BY FETAL GROWTH RESTRICTION: Primary | ICD-10-CM

## 2023-11-06 PROCEDURE — 99212 OFFICE O/P EST SF 10 MIN: CPT | Performed by: ADVANCED PRACTICE MIDWIFE

## 2023-11-06 PROCEDURE — 3078F DIAST BP <80 MM HG: CPT | Performed by: ADVANCED PRACTICE MIDWIFE

## 2023-11-06 PROCEDURE — 3074F SYST BP LT 130 MM HG: CPT | Performed by: ADVANCED PRACTICE MIDWIFE

## 2023-11-09 NOTE — PROGRESS NOTES
Active baby. Discussed plan of care for IUGR. Has growth ultrasound scheduled. Encouraged fetal movement awareness. Danger signs reviewed in detail.

## 2023-11-18 PROBLEM — Z34.90 PREGNANT: Status: ACTIVE | Noted: 2023-11-18

## 2023-11-18 PROBLEM — Z34.90 PREGNANT (HCC): Status: ACTIVE | Noted: 2023-11-18

## 2023-11-18 PROBLEM — O36.5990 FETAL GROWTH RESTRICTION ANTEPARTUM: Status: ACTIVE | Noted: 2023-11-18

## 2023-11-18 PROBLEM — O36.5990 FETAL GROWTH RESTRICTION ANTEPARTUM (HCC): Status: ACTIVE | Noted: 2023-11-18

## 2023-11-18 NOTE — PROGRESS NOTES
Austin Moreno is a 33year old  pt at 36w2d here for ALICE  She is feeling well, although concerned about US results today. Has had some heartburn  Denies UTI s/s  Taking levothyroxine 25mcg and Iron  Birth Plan reviewed    ROS:  Bright Parada, Denies bleeding, leaking of fluid. Fetus is active. Vitals:    23 1030   BP: 126/79   Pulse: 106   Weight: 145 lb 2 oz (65.8 kg)      See flowsheet  TW lbs  S/p Tdap and flu  Growth US 10/25- 32.4 WBD, 31.2 WBS, BPP 8/8, 25%, UAD 3.52 (89%)  3T 10/9 Hgb 10.5  Growth US today- EFW 2352 g ( 5 lb 3 oz); 12%. AC9%  MVP is 2.9 cm . MISAEL 7.5 cm  BPP is 8/8. Umbilical Artery Doppler is 4.08 and is elevated. Assessment/Plan:  IUP @ 36.2 wga  Anemia  Fetal growth restriction    Orders Placed This Encounter   Procedures    Group B Strep PCR      Reviewed:   labor precautions  Kick counts  Danger Signs  GBS 9pt self collected  To triage today for Betamethasone  Wednesday NST  IOL sched for Saturday AT 10am    Pt verbalized understanding. All questions answered.   No barriers to learning identified

## 2023-11-20 ENCOUNTER — TELEPHONE (OUTPATIENT)
Dept: OBGYN CLINIC | Facility: CLINIC | Age: 28
End: 2023-11-20

## 2023-11-20 ENCOUNTER — HOSPITAL ENCOUNTER (OUTPATIENT)
Dept: PERINATAL CARE | Facility: HOSPITAL | Age: 28
Discharge: HOME | End: 2023-11-20
Attending: OBSTETRICS & GYNECOLOGY
Payer: MEDICAID

## 2023-11-20 ENCOUNTER — ROUTINE PRENATAL (OUTPATIENT)
Dept: OBGYN CLINIC | Facility: CLINIC | Age: 28
End: 2023-11-20

## 2023-11-20 ENCOUNTER — TELEPHONE (OUTPATIENT)
Dept: OBGYN UNIT | Facility: HOSPITAL | Age: 28
End: 2023-11-20

## 2023-11-20 ENCOUNTER — HOSPITAL ENCOUNTER (OUTPATIENT)
Dept: PERINATAL CARE | Facility: HOSPITAL | Age: 28
End: 2023-11-20
Attending: OBSTETRICS & GYNECOLOGY

## 2023-11-20 ENCOUNTER — HOSPITAL ENCOUNTER (OUTPATIENT)
Facility: HOSPITAL | Age: 28
Discharge: HOME | End: 2023-11-20
Attending: ADVANCED PRACTICE MIDWIFE | Admitting: OBSTETRICS & GYNECOLOGY
Payer: MEDICAID

## 2023-11-20 VITALS
SYSTOLIC BLOOD PRESSURE: 126 MMHG | HEART RATE: 106 BPM | BODY MASS INDEX: 28 KG/M2 | DIASTOLIC BLOOD PRESSURE: 79 MMHG | WEIGHT: 145.13 LBS

## 2023-11-20 VITALS
WEIGHT: 144 LBS | DIASTOLIC BLOOD PRESSURE: 71 MMHG | HEART RATE: 108 BPM | SYSTOLIC BLOOD PRESSURE: 110 MMHG | BODY MASS INDEX: 28 KG/M2

## 2023-11-20 DIAGNOSIS — E03.9 HYPOTHYROIDISM AFFECTING PREGNANCY, ANTEPARTUM: ICD-10-CM

## 2023-11-20 DIAGNOSIS — E03.9 HYPOTHYROIDISM AFFECTING PREGNANCY IN THIRD TRIMESTER: Primary | ICD-10-CM

## 2023-11-20 DIAGNOSIS — O99.280 HYPOTHYROIDISM AFFECTING PREGNANCY, ANTEPARTUM: ICD-10-CM

## 2023-11-20 DIAGNOSIS — O36.5930 POOR FETAL GROWTH AFFECTING MANAGEMENT OF MOTHER IN THIRD TRIMESTER, SINGLE OR UNSPECIFIED FETUS: Primary | ICD-10-CM

## 2023-11-20 DIAGNOSIS — O36.5990 FETAL GROWTH RESTRICTION ANTEPARTUM: ICD-10-CM

## 2023-11-20 DIAGNOSIS — O99.283 HYPOTHYROIDISM AFFECTING PREGNANCY IN THIRD TRIMESTER: Primary | ICD-10-CM

## 2023-11-20 DIAGNOSIS — Z34.83 ENCOUNTER FOR SUPERVISION OF OTHER NORMAL PREGNANCY IN THIRD TRIMESTER: ICD-10-CM

## 2023-11-20 DIAGNOSIS — O99.013 ANEMIA DURING PREGNANCY IN THIRD TRIMESTER: ICD-10-CM

## 2023-11-20 PROCEDURE — 76819 FETAL BIOPHYS PROFIL W/O NST: CPT

## 2023-11-20 PROCEDURE — 76816 OB US FOLLOW-UP PER FETUS: CPT | Performed by: OBSTETRICS & GYNECOLOGY

## 2023-11-20 PROCEDURE — 3074F SYST BP LT 130 MM HG: CPT | Performed by: ADVANCED PRACTICE MIDWIFE

## 2023-11-20 PROCEDURE — 76820 UMBILICAL ARTERY ECHO: CPT

## 2023-11-20 PROCEDURE — 96372 THER/PROPH/DIAG INJ SC/IM: CPT

## 2023-11-20 PROCEDURE — 99212 OFFICE O/P EST SF 10 MIN: CPT | Performed by: ADVANCED PRACTICE MIDWIFE

## 2023-11-20 PROCEDURE — 3078F DIAST BP <80 MM HG: CPT | Performed by: ADVANCED PRACTICE MIDWIFE

## 2023-11-20 RX ORDER — BETAMETHASONE SODIUM PHOSPHATE AND BETAMETHASONE ACETATE 3; 3 MG/ML; MG/ML
INJECTION, SUSPENSION INTRA-ARTICULAR; INTRALESIONAL; INTRAMUSCULAR; SOFT TISSUE
Status: COMPLETED
Start: 2023-11-20 | End: 2023-11-20

## 2023-11-20 RX ORDER — BETAMETHASONE SODIUM PHOSPHATE AND BETAMETHASONE ACETATE 3; 3 MG/ML; MG/ML
12 INJECTION, SUSPENSION INTRA-ARTICULAR; INTRALESIONAL; INTRAMUSCULAR; SOFT TISSUE ONCE
Status: COMPLETED | OUTPATIENT
Start: 2023-11-20 | End: 2023-11-20

## 2023-11-20 NOTE — PROGRESS NOTES
Pt is a 33year old female admitted to TR3/TR3-A. Chief Complaint   Patient presents with    Betamethasone Injection     1st shot        Pt is  36w2d intra-uterine pregnancy. History obtained, consents signed. Oriented to room, staff, and plan of care.

## 2023-11-20 NOTE — PROGRESS NOTES
Pt sent from [facility] office. Pt had BPP done earlier today w/ MFM. Pt sent from [facility] office for betamethasone. Discharged to home per ambulatory in stable condition.

## 2023-11-21 ENCOUNTER — HOSPITAL ENCOUNTER (OUTPATIENT)
Facility: HOSPITAL | Age: 28
Discharge: HOME | End: 2023-11-21
Attending: ADVANCED PRACTICE MIDWIFE | Admitting: OBSTETRICS & GYNECOLOGY
Payer: MEDICAID

## 2023-11-21 LAB — GROUP B STREP BY PCR FOR PCR OVT: NEGATIVE

## 2023-11-21 PROCEDURE — 59025 FETAL NON-STRESS TEST: CPT

## 2023-11-21 PROCEDURE — 99213 OFFICE O/P EST LOW 20 MIN: CPT

## 2023-11-21 PROCEDURE — 96372 THER/PROPH/DIAG INJ SC/IM: CPT

## 2023-11-21 RX ORDER — BETAMETHASONE SODIUM PHOSPHATE AND BETAMETHASONE ACETATE 3; 3 MG/ML; MG/ML
12 INJECTION, SUSPENSION INTRA-ARTICULAR; INTRALESIONAL; INTRAMUSCULAR; SOFT TISSUE EVERY 24 HOURS
Status: DISCONTINUED | OUTPATIENT
Start: 2023-11-21 | End: 2023-11-21

## 2023-11-21 NOTE — TRIAGE
[facility]      Triage Note    Jean Carlos Blount Patient Status:  Outpatient    10/22/1995 MRN [id number]   Location [address] Attending Caryle Soja, CNM   Hosp Day # 0 PCP PHYSICIAN NONSTAFF      Para: [phone]  Estimated Date of Delivery: 23  Gestation: 36w3d    Chief Complaint    Betamethasone Injection; Non-stress Test         Allergies:  No Known Allergies    No orders of the defined types were placed in this encounter. Lab Results   Component Value Date    WBC 6.9 10/09/2023    HGB 10.5 (L) 10/09/2023    HCT 31.1 (L) 10/09/2023    .0 10/09/2023    CREATSERUM 0.66 2020    BUN 8 2020     2020    K 3.8 2020     2020    CO2 27.0 2020    GLU 80 2020    CA 8.7 2020    ALB 2.8 (L) 2020    ALKPHO 76 2020    BILT 0.3 2020    TP 7.3 2020    AST 16 2020    ALT 15 2020    T4F 1.1 10/09/2023    TSH 2.660 10/09/2023     2020       Clinitek UA  Lab Results   Component Value Date    GLUUR Negative 2022    SPECGRAVITY 1.010 2022    URINECUL No Growth at 18-24 hrs. 2023       UA  Lab Results   Component Value Date    COLORUR Yellow 2022    CLARITY Slightly Cloudy (A) 2022    SPECGRAVITY 1.010 2022    PROUR Negative 2022    GLUUR Negative 2022    KETUR >=160 (A) 2022    BILUR Negative 2022    BLOODURINE Negative 2022    NITRITE Positive (A) 2022    UROBILINOGEN 0.2 2022    LEUUR Small (A) 2022       There were no vitals filed for this visit.     NST  Variability: Moderate           Accelerations: Yes           Decelerations: None            Baseline: 125 BPM           Uterine Irritability: No           Contractions: Not present                                        Acoustic Stimulator: No           Nonstress Test Interpretation: Reactive           Nonstress Test Second Interpretation: Reactive          FHR Category: Category I             Additional Comments   Pt presenetd to TR 4 for scheduled 2nd dose of betamethasone. NST ordered/obtained per [facility] CNM-reactive. Pt schedules for follow-up NST for Fri-pt verbalizes understanding. Pt discharged home in stable condition. Chief Complaint   Patient presents with    Betamethasone Injection    Non-stress Test     Per Midwife wants to add NST and discuss induction concerns with [facility]. Estelita Lawton RN  11/21/2023 1:09 PM    ADDENDUM:  Ale Vazquez presented for her 2nd dose of betamethasone. Pt was scheduled for NST tomorrow, but did today (see above). Pt expresses desire to postpone IOL until Next Thursday when she is 38 wks. I reviewed with her her US results of increased UAD and recommendation from MFM to given the betamethasone yesterday and today. Pt wonders if she would be able to just come in for NSTs daily until 38 wks (IOL). I explained to her that I would call [facility] on call today and review with them. I spoke with Dr. Ava George and he eiterates Dr. Douglas Grady recommendation for IOL at 37 wks this Saturday which is based on [facility] guidelines. I discussed with the pt the increased risk of IUFD and that we can not predict if or when that would happen. All questions were answered and pt verbalized understanding.   She will Return to triage on Friday for repeat NST and then IOL on Saturday 11/25 at [address] [place] [address]

## 2023-11-21 NOTE — PROGRESS NOTES
Pt is a 33year old female admitted to TR4/TR4-A. Chief Complaint   Patient presents with    Betamethasone Injection    Non-stress Test     Per Midwife wants to add NST and discuss induction concerns with MFM. Pt is  36w3d intra-uterine pregnancy. History obtained, consents signed. Oriented to room, staff, and plan of care.

## 2023-11-24 ENCOUNTER — APPOINTMENT (OUTPATIENT)
Dept: OBGYN CLINIC | Facility: HOSPITAL | Age: 28
End: 2023-11-24
Payer: MEDICAID

## 2023-11-24 ENCOUNTER — HOSPITAL ENCOUNTER (OUTPATIENT)
Facility: HOSPITAL | Age: 28
Discharge: HOME | End: 2023-11-24
Attending: ADVANCED PRACTICE MIDWIFE | Admitting: OBSTETRICS & GYNECOLOGY
Payer: MEDICAID

## 2023-11-24 VITALS
DIASTOLIC BLOOD PRESSURE: 68 MMHG | HEART RATE: 90 BPM | WEIGHT: 145 LBS | BODY MASS INDEX: 28 KG/M2 | SYSTOLIC BLOOD PRESSURE: 110 MMHG

## 2023-11-24 DIAGNOSIS — O36.5930 INTRAUTERINE GROWTH RESTRICTION (IUGR) AFFECTING CARE OF MOTHER, THIRD TRIMESTER, SINGLE GESTATION: ICD-10-CM

## 2023-11-24 PROCEDURE — 59025 FETAL NON-STRESS TEST: CPT

## 2023-11-24 NOTE — PROGRESS NOTES
Pt is a 33year old female admitted to TR2/TR2-A. Chief Complaint   Patient presents with    Non-stress Test     Suspected IUGR      Pt is  36w6d intra-uterine pregnancy. History obtained, consents signed. Oriented to room, staff, and plan of care.

## 2023-11-24 NOTE — NST
Nonstress Test   Patient: Jean Carlos Blount    Gestation: 36w6d    NST:       Variability: Moderate           Accelerations: Yes           Decelerations: None            Baseline: 130 BPM           Uterine Irritability: No           Contractions: Not present                                        Acoustic Stimulator: No           Nonstress Test Interpretation: Reactive           Nonstress Test Second Interpretation: Reactive                     Additional Comments Comments:  at 36 6/7 weeks reports to triage for scheduled NST for suspected IUGR. NSt reactive. Reported to [facility]. Discharged home with precautions.

## 2023-11-25 ENCOUNTER — APPOINTMENT (OUTPATIENT)
Dept: OBGYN CLINIC | Facility: HOSPITAL | Age: 28
End: 2023-11-25
Attending: ADVANCED PRACTICE MIDWIFE
Payer: MEDICAID

## 2023-11-25 ENCOUNTER — HOSPITAL ENCOUNTER (INPATIENT)
Facility: HOSPITAL | Age: 28
LOS: 2 days | Discharge: HOME | End: 2023-11-27
Attending: ADVANCED PRACTICE MIDWIFE | Admitting: OBSTETRICS & GYNECOLOGY
Payer: MEDICAID

## 2023-11-25 PROBLEM — O36.5990 IUGR (INTRAUTERINE GROWTH RESTRICTION) AFFECTING CARE OF MOTHER: Status: ACTIVE | Noted: 2023-11-25

## 2023-11-25 PROBLEM — O99.019 ANEMIA AFFECTING PREGNANCY (HCC): Status: ACTIVE | Noted: 2023-11-25

## 2023-11-25 PROBLEM — O36.5990 IUGR (INTRAUTERINE GROWTH RESTRICTION) AFFECTING CARE OF MOTHER (HCC): Status: ACTIVE | Noted: 2023-11-25

## 2023-11-25 PROBLEM — O99.019 ANEMIA AFFECTING PREGNANCY: Status: ACTIVE | Noted: 2023-11-25

## 2023-11-25 LAB
ANTIBODY SCREEN: NEGATIVE
BASOPHILS # BLD AUTO: 0.02 X10(3) UL (ref 0–0.2)
BASOPHILS NFR BLD AUTO: 0.2 %
DEPRECATED RDW RBC AUTO: 45.2 FL (ref 35.1–46.3)
EOSINOPHIL # BLD AUTO: 0.1 X10(3) UL (ref 0–0.7)
EOSINOPHIL NFR BLD AUTO: 1 %
ERYTHROCYTE [DISTWIDTH] IN BLOOD BY AUTOMATED COUNT: 13.8 % (ref 11–15)
HCT VFR BLD AUTO: 30.9 %
HGB BLD-MCNC: 10.3 G/DL
IMM GRANULOCYTES # BLD AUTO: 0.08 X10(3) UL (ref 0–1)
IMM GRANULOCYTES NFR BLD: 0.8 %
LYMPHOCYTES # BLD AUTO: 1.43 X10(3) UL (ref 1–4)
LYMPHOCYTES NFR BLD AUTO: 14.9 %
MCH RBC QN AUTO: 29.9 PG (ref 26–34)
MCHC RBC AUTO-ENTMCNC: 33.3 G/DL (ref 31–37)
MCV RBC AUTO: 89.8 FL
MONOCYTES # BLD AUTO: 0.84 X10(3) UL (ref 0.1–1)
MONOCYTES NFR BLD AUTO: 8.7 %
NEUTROPHILS # BLD AUTO: 7.15 X10 (3) UL (ref 1.5–7.7)
NEUTROPHILS # BLD AUTO: 7.15 X10(3) UL (ref 1.5–7.7)
NEUTROPHILS NFR BLD AUTO: 74.4 %
PLATELET # BLD AUTO: 266 10(3)UL (ref 150–450)
RBC # BLD AUTO: 3.44 X10(6)UL
RH BLOOD TYPE: POSITIVE
WBC # BLD AUTO: 9.6 X10(3) UL (ref 4–11)

## 2023-11-25 PROCEDURE — 3E0P7VZ INTRODUCTION OF HORMONE INTO FEMALE REPRODUCTIVE, VIA NATURAL OR ARTIFICIAL OPENING: ICD-10-PCS | Performed by: ADVANCED PRACTICE MIDWIFE

## 2023-11-25 RX ORDER — ACETAMINOPHEN 500 MG
500 TABLET ORAL EVERY 6 HOURS PRN
Status: DISCONTINUED | OUTPATIENT
Start: 2023-11-25 | End: 2023-11-26 | Stop reason: HOSPADM

## 2023-11-25 RX ORDER — LIDOCAINE HYDROCHLORIDE 10 MG/ML
30 INJECTION, SOLUTION EPIDURAL; INFILTRATION; INTRACAUDAL; PERINEURAL ONCE
Status: DISCONTINUED | OUTPATIENT
Start: 2023-11-25 | End: 2023-11-26 | Stop reason: HOSPADM

## 2023-11-25 RX ORDER — SODIUM CHLORIDE, SODIUM LACTATE, POTASSIUM CHLORIDE, CALCIUM CHLORIDE 600; 310; 30; 20 MG/100ML; MG/100ML; MG/100ML; MG/100ML
INJECTION, SOLUTION INTRAVENOUS AS NEEDED
Status: DISCONTINUED | OUTPATIENT
Start: 2023-11-25 | End: 2023-11-26 | Stop reason: HOSPADM

## 2023-11-25 RX ORDER — LEVOTHYROXINE SODIUM 0.03 MG/1
25 TABLET ORAL
Status: DISCONTINUED | OUTPATIENT
Start: 2023-11-26 | End: 2023-11-26

## 2023-11-25 RX ORDER — TERBUTALINE SULFATE 1 MG/ML
0.25 INJECTION, SOLUTION SUBCUTANEOUS AS NEEDED
Status: DISCONTINUED | OUTPATIENT
Start: 2023-11-25 | End: 2023-11-26 | Stop reason: HOSPADM

## 2023-11-25 RX ORDER — DEXTROSE, SODIUM CHLORIDE, SODIUM LACTATE, POTASSIUM CHLORIDE, AND CALCIUM CHLORIDE 5; .6; .31; .03; .02 G/100ML; G/100ML; G/100ML; G/100ML; G/100ML
INJECTION, SOLUTION INTRAVENOUS CONTINUOUS
Status: DISCONTINUED | OUTPATIENT
Start: 2023-11-25 | End: 2023-11-26 | Stop reason: HOSPADM

## 2023-11-25 RX ORDER — IBUPROFEN 600 MG/1
600 TABLET ORAL ONCE AS NEEDED
Status: COMPLETED | OUTPATIENT
Start: 2023-11-25 | End: 2023-11-26

## 2023-11-25 RX ORDER — CITRIC ACID/SODIUM CITRATE 334-500MG
30 SOLUTION, ORAL ORAL AS NEEDED
Status: DISCONTINUED | OUTPATIENT
Start: 2023-11-25 | End: 2023-11-26 | Stop reason: HOSPADM

## 2023-11-25 RX ORDER — ACETAMINOPHEN 500 MG
1000 TABLET ORAL EVERY 6 HOURS PRN
Status: DISCONTINUED | OUTPATIENT
Start: 2023-11-25 | End: 2023-11-26 | Stop reason: HOSPADM

## 2023-11-25 RX ORDER — ONDANSETRON 2 MG/ML
4 INJECTION INTRAMUSCULAR; INTRAVENOUS EVERY 6 HOURS PRN
Status: DISCONTINUED | OUTPATIENT
Start: 2023-11-25 | End: 2023-11-26 | Stop reason: HOSPADM

## 2023-11-25 RX ORDER — DOXYCYCLINE HYCLATE 50 MG/1
325 CAPSULE, GELATIN COATED ORAL
COMMUNITY

## 2023-11-26 ENCOUNTER — ANESTHESIA EVENT (OUTPATIENT)
Dept: OBGYN UNIT | Facility: HOSPITAL | Age: 28
End: 2023-11-26
Payer: MEDICAID

## 2023-11-26 ENCOUNTER — ANESTHESIA (OUTPATIENT)
Dept: OBGYN UNIT | Facility: HOSPITAL | Age: 28
End: 2023-11-26
Payer: MEDICAID

## 2023-11-26 PROCEDURE — 59409 OBSTETRICAL CARE: CPT | Performed by: ADVANCED PRACTICE MIDWIFE

## 2023-11-26 PROCEDURE — 0HQ9XZZ REPAIR PERINEUM SKIN, EXTERNAL APPROACH: ICD-10-PCS | Performed by: ADVANCED PRACTICE MIDWIFE

## 2023-11-26 RX ORDER — AMMONIA INHALANTS 0.04 G/.3ML
0.3 INHALANT RESPIRATORY (INHALATION) AS NEEDED
Status: DISCONTINUED | OUTPATIENT
Start: 2023-11-26 | End: 2023-11-27

## 2023-11-26 RX ORDER — LEVOTHYROXINE SODIUM 0.03 MG/1
25 TABLET ORAL
Status: DISCONTINUED | OUTPATIENT
Start: 2023-11-26 | End: 2023-11-27

## 2023-11-26 RX ORDER — SIMETHICONE 80 MG
80 TABLET,CHEWABLE ORAL 3 TIMES DAILY PRN
Status: DISCONTINUED | OUTPATIENT
Start: 2023-11-26 | End: 2023-11-27

## 2023-11-26 RX ORDER — ONDANSETRON 2 MG/ML
4 INJECTION INTRAMUSCULAR; INTRAVENOUS EVERY 6 HOURS PRN
Status: DISCONTINUED | OUTPATIENT
Start: 2023-11-26 | End: 2023-11-27

## 2023-11-26 RX ORDER — DOCUSATE SODIUM 100 MG/1
100 CAPSULE, LIQUID FILLED ORAL
Status: DISCONTINUED | OUTPATIENT
Start: 2023-11-26 | End: 2023-11-27

## 2023-11-26 RX ORDER — BISACODYL 10 MG
10 SUPPOSITORY, RECTAL RECTAL ONCE AS NEEDED
Status: DISCONTINUED | OUTPATIENT
Start: 2023-11-26 | End: 2023-11-27

## 2023-11-26 RX ORDER — ACETAMINOPHEN 500 MG
500 TABLET ORAL EVERY 6 HOURS PRN
Status: DISCONTINUED | OUTPATIENT
Start: 2023-11-26 | End: 2023-11-27

## 2023-11-26 RX ORDER — LIDOCAINE HYDROCHLORIDE 10 MG/ML
INJECTION, SOLUTION EPIDURAL; INFILTRATION; INTRACAUDAL; PERINEURAL AS NEEDED
Status: DISCONTINUED | OUTPATIENT
Start: 2023-11-26 | End: 2023-11-26 | Stop reason: SURG

## 2023-11-26 RX ORDER — ACETAMINOPHEN 500 MG
1000 TABLET ORAL EVERY 6 HOURS PRN
Status: DISCONTINUED | OUTPATIENT
Start: 2023-11-26 | End: 2023-11-27

## 2023-11-26 RX ORDER — DIAPER,BRIEF,INFANT-TODD,DISP
1 EACH MISCELLANEOUS EVERY 6 HOURS PRN
Status: DISCONTINUED | OUTPATIENT
Start: 2023-11-26 | End: 2023-11-27

## 2023-11-26 RX ORDER — BUPIVACAINE HYDROCHLORIDE 2.5 MG/ML
20 INJECTION, SOLUTION EPIDURAL; INFILTRATION; INTRACAUDAL ONCE
Status: COMPLETED | OUTPATIENT
Start: 2023-11-26 | End: 2023-11-26

## 2023-11-26 RX ORDER — LIDOCAINE HYDROCHLORIDE AND EPINEPHRINE 15; 5 MG/ML; UG/ML
INJECTION, SOLUTION EPIDURAL AS NEEDED
Status: DISCONTINUED | OUTPATIENT
Start: 2023-11-26 | End: 2023-11-26 | Stop reason: SURG

## 2023-11-26 RX ORDER — IBUPROFEN 600 MG/1
600 TABLET ORAL EVERY 6 HOURS
Status: DISCONTINUED | OUTPATIENT
Start: 2023-11-26 | End: 2023-11-27

## 2023-11-26 RX ORDER — BUPIVACAINE HCL/0.9 % NACL/PF 0.25 %
5 PLASTIC BAG, INJECTION (ML) EPIDURAL AS NEEDED
Status: DISCONTINUED | OUTPATIENT
Start: 2023-11-26 | End: 2023-11-26

## 2023-11-26 RX ORDER — NALBUPHINE HYDROCHLORIDE 10 MG/ML
2.5 INJECTION, SOLUTION INTRAMUSCULAR; INTRAVENOUS; SUBCUTANEOUS
Status: DISCONTINUED | OUTPATIENT
Start: 2023-11-26 | End: 2023-11-26

## 2023-11-26 RX ORDER — MELATONIN
325
Status: DISCONTINUED | OUTPATIENT
Start: 2023-11-26 | End: 2023-11-27

## 2023-11-26 RX ADMIN — BUPIVACAINE HYDROCHLORIDE 10 ML: 2.5 INJECTION, SOLUTION EPIDURAL; INFILTRATION; INTRACAUDAL at 03:09:00

## 2023-11-26 RX ADMIN — LIDOCAINE HYDROCHLORIDE 3 ML: 10 INJECTION, SOLUTION EPIDURAL; INFILTRATION; INTRACAUDAL; PERINEURAL at 03:06:00

## 2023-11-26 RX ADMIN — LIDOCAINE HYDROCHLORIDE AND EPINEPHRINE 5 ML: 15; 5 INJECTION, SOLUTION EPIDURAL at 03:08:00

## 2023-11-26 NOTE — PROGRESS NOTES
Patient up to bathroom with assist x 1. Voided 500ml. Patient transferred to mother/baby room 350 per wheelchair in stable condition with baby and personal belongings. Accompanied by significant other and staff. Report given to Carri Hampton mother/baby RN.

## 2023-11-26 NOTE — PLAN OF CARE
Problem: BIRTH - VAGINAL/ SECTION  Goal: Fetal and maternal status remain reassuring during the birth process  Description: INTERVENTIONS:  - Monitor vital signs  - Monitor fetal heart rate  - Monitor uterine activity  - Monitor labor progression (vaginal delivery)  - DVT prophylaxis (C/S delivery)  - Surgical antibiotic prophylaxis (C/S delivery)  Outcome: Progressing     Problem: PAIN - ADULT  Goal: Verbalizes/displays adequate comfort level or patient's stated pain goal  Description: INTERVENTIONS:  - Encourage pt to monitor pain and request assistance  - Assess pain using appropriate pain scale  - Administer analgesics based on type and severity of pain and evaluate response  - Implement non-pharmacological measures as appropriate and evaluate response  - Consider cultural and social influences on pain and pain management  - Manage/alleviate anxiety  - Utilize distraction and/or relaxation techniques  - Monitor for opioid side effects  - Notify MD/LIP if interventions unsuccessful or patient reports new pain  - Anticipate increased pain with activity and pre-medicate as appropriate  Outcome: Progressing     Problem: ANXIETY  Goal: Will report anxiety at manageable levels  Description: INTERVENTIONS:  - Administer medication as ordered  - Teach and rehearse alternative coping skills  - Provide emotional support with 1:1 interaction with staff  Outcome: Progressing     Problem: Patient Centered Care  Goal: Patient preferences are identified and integrated in the patient's plan of care  Description: Interventions:  - What would you like us to know as we care for you?  We're having a baby girl!  - Provide timely, complete, and accurate information to patient/family  - Incorporate patient and family knowledge, values, beliefs, and cultural backgrounds into the planning and delivery of care  - Encourage patient/family to participate in care and decision-making at the level they choose  - [place] patient and family perspectives and choices  Outcome: Progressing     Problem: Patient/Family Goals  Goal: Patient/Family Long Term Goal  Description: Patient's Long Term Goal: Maintain pregnancy    Interventions:  - Multidisciplinary approach to provide holistic care  - Provide disease specific education and reinforcement throughout hospitalization     Outcome: Progressing  Goal: Patient/Family Short Term Goal  Description: Patient's Short Term Goal: Understand anticipatory needs of  at this gestational age    Interventions:   - Neonatology consult   - Lactation consult  Patient's Short Term Goal: Understand anticipatory needs of  at this gestational age    Interventions:   - Neonatology consult   - Lactation consult    Outcome: Progressing

## 2023-11-26 NOTE — L&D DELIVERY NOTE
Cortez Mckeon, Girl [[id number]]      Labor Events     labor?: Yes   steroids?: Full Course  Antibiotics received during labor?: No  Rupture date/time: 2023 0358     Rupture type: SROM  Fluid color: Clear, Pink  Labor type:  Induced Onset of Labor  Induction: Misoprostol, Oxytocin  Indications for induction: Suspected IUGR       Labor Event Times    Labor onset date/time: 2023  Dilation complete date/time: 2023  Start pushing date/time: 2023 0540        Presentation    Presentation: Vertex       Operative Delivery    Operative Vaginal Delivery: No                      Shoulder Dystocia    Shoulder Dystocia: No             Anesthesia    Method: Epidural               Delivery      Head delivery date/time: 2023 05:42:24   Delivery date/time:  23 05:42:29   Delivery type: Normal spontaneous vaginal delivery    Details:     Delivery location: delivery room  Delivery Room Temperature: 72       Delivery Providers    Delivering Clinician: Dylan Hui CNM   Delivery personnel:  Provider Role   Luis Felipe Leon, RN Baby Nurse   Marcela Miller, RN Delivery Nurse   Venice Woodruff, RN Delivery Nurse             Cord    Vessels: 3 Vessels  Complications: None  Timed cord clamping: Yes        Measurements      Weight: 2270 g 5 lb 0.1 oz Length: 43.2 cm                       Placenta    Date/time: 2023 0550  Removal: Spontaneous  Appearance: Intact       Apgars    Living status: Living   Apgar Scoring Key:    0 1 2    Skin color Blue or pale Acrocyanotic Completely pink    Heart rate Absent <100 bpm >100 bpm    Reflex irritability No response Grimace Cry or active withdrawal    Muscle tone Limp Some flexion Active motion    Respiratory effort Absent Weak cry; hypoventilation Good, crying              1 Minute:  5 Minute:  10 Minute:  15 Minute:  20 Minute:      Skin color: 1  1       Heart rate: 2  2       Reflex irritablity: 2 Muscle tone: 2  2       Respiratory effort: 2        Total: 9           Apgars assigned by: Zondra Dakins       Skin to Skin    Skin to skin initiated date/time: 11/26/2023 0542  Skin to skin with: Mother       Vaginal Count    Initial count RN: Alejandra Coy RN  Initial count Tech: Boyd [place]   Sponges   Sharps    Initial counts 10   0    Final counts 10       Final count RN: Alejandra Coy RN  Final count MD: Marta Logan CNM       Delivery (Maternal)    Episiotomy: None  Perineal lacerations: 1st Repaired?: Yes     Vaginal laceration?: No      Cervical laceration?: No    Clitoral laceration?: No                  Simi Alcocer progressed to complete dilatation and +2 station prior to pushing successfully to viable baby girl. See Delivery Summary above for time, APGARs, and weight. Fetal head presented in the OA position and rotated to MILI. Sweep for nuchal cord was performed and no nuchal cord identified. Anterior shoulder delivered spontaneously without traction. Cord wrapped around baby's arm. Baby vigorous at birth. To maternal abdomen for dry and stimulation then cord cut after pulsing ceased. Prophylactic IV pitocin initiated in 3rd stage. Spontaneous placenta by jay mechanism, complete with 3 vessel cord. Hemostasis achieved by fundal massage and prophylactic IV Pitocin. Vagina and perineum inspected and first degree perineal laceration repaired in usual fashion with 3-0 vicryl to achieve hemostasis. Mother and infant appeared in stable condition when midwife left the delivery room. Sharps and 4x4 counts were correct. Skin to skin initiated.     Placenta to pathology due to SGA infant    Quantitative Blood Loss (mL)  100

## 2023-11-26 NOTE — ANESTHESIA PROCEDURE NOTES
Labor Analgesia    Date/Time: 11/26/2023 3:06 AM    Performed by: Zack Bell MD  Authorized by: Zack Bell MD      General Information and Staff    Start Time:  11/26/2023 3:06 AM  End Time:  11/26/2023 3:10 AM  Anesthesiologist:  Zack Bell MD  Performed by:   Anesthesiologist  Patient Location:  OB  Site Identification: surface landmarks    Reason for Block: labor epidural    Preanesthetic Checklist: patient identified, IV checked, site marked, risks and benefits discussed, monitors and equipment checked, pre-op evaluation, timeout performed, IV bolus, anesthesia consent and sterile technique used      Procedure Details    Patient Position:  Sitting  Prep: ChloraPrep and patient draped    Monitoring:  Heart rate, cardiac monitor and continuous pulse ox  Approach:  Midline    Epidural Needle    Injection Technique:  GAYE air  Needle Type:  Tuohy  Needle Gauge:  18 G  Needle Length:  3.5 in  Needle Insertion Depth:  4  Location:  L3-4    Spinal Needle      Catheter    Catheter Type:  Multi-orifice  Catheter Size:  20 G  Catheter at Skin Depth:  8  Test Dose:  Negative    Assessment    Sensory Level:  T10    Additional Comments

## 2023-11-26 NOTE — LACTATION NOTE
LACTATION NOTE - MOTHER      Evaluation Type: Inpatient    Problems identified  Problems identified: Knowledge deficit;Milk supply not WNL  Milk supply not WNL: Reduced (potential)    Maternal history  Maternal history: Anxiety; Hypothyroid  Other/comment: HX: Post partum depression    Breastfeeding goal  Breastfeeding goal: To maintain breast milk feeding per patient goal    Maternal Assessment  Bilateral Breasts: Soft;Symmetrical  Bilateral Nipples: WNL  Prior breastfeeding experience (comment below): Multip; Unsuccessful  Breastfeeding Assistance: Breastfeeding assistance provided with permission;Pumping assistance provided with permission;Hand expression provided with permission;Breast exam provided with permission    Pain assessment  Location/Comment: denies  Treatment of Sore Nipples: Lanolin    Guidelines for use of:  Breast pump type: Hand Pump                Patient would like to breast fed, and has been giving formula. Baby is 37 weeks and very sleepy. Mom was not successful with breast feeding her first.  [facility] assisted patient with trying to fed. Baby was too sleepy and would not wake to latch, mom assisted with hand expression and spoon feeding, and using the hand pump. Mom getting lots of colostrum with expression. Plan is mom will call for next feeding after  has blood sugar checked.   Mom verbalizes understanding of plan

## 2023-11-27 VITALS
HEART RATE: 72 BPM | OXYGEN SATURATION: 100 % | TEMPERATURE: 98 F | RESPIRATION RATE: 18 BRPM | WEIGHT: 145 LBS | DIASTOLIC BLOOD PRESSURE: 74 MMHG | SYSTOLIC BLOOD PRESSURE: 105 MMHG | HEIGHT: 60 IN | BODY MASS INDEX: 28.47 KG/M2

## 2023-11-27 LAB
DEPRECATED RDW RBC AUTO: 47.3 FL (ref 35.1–46.3)
ERYTHROCYTE [DISTWIDTH] IN BLOOD BY AUTOMATED COUNT: 13.8 % (ref 11–15)
HCT VFR BLD AUTO: 30.2 %
HGB BLD-MCNC: 9.9 G/DL
MCH RBC QN AUTO: 30.6 PG (ref 26–34)
MCHC RBC AUTO-ENTMCNC: 32.8 G/DL (ref 31–37)
MCV RBC AUTO: 93.2 FL
PLATELET # BLD AUTO: 223 10(3)UL (ref 150–450)
RBC # BLD AUTO: 3.24 X10(6)UL
WBC # BLD AUTO: 13.6 X10(3) UL (ref 4–11)

## 2023-11-27 RX ORDER — IBUPROFEN 600 MG/1
600 TABLET ORAL EVERY 6 HOURS PRN
Qty: 30 TABLET | Refills: 0 | Status: SHIPPED | OUTPATIENT
Start: 2023-11-27

## 2023-11-27 RX ORDER — LEVOTHYROXINE SODIUM 0.03 MG/1
25 TABLET ORAL
Qty: 30 TABLET | Refills: 0 | Status: SHIPPED | OUTPATIENT
Start: 2023-11-27

## 2023-11-27 NOTE — CM/SW NOTE
SW self referral due to finances/WIC resources;     SW met with patient and FOB  bedside. SW confirmed face sheet contact as correct. Baby boy/girl name:Maryam Vergara  Date & time of delivery:11/26/23 @ 5:42am  Delivery method: Normal spontaneous vaginal delivery   Siblings age:2 yr old    Patient employed: Denied  Length of maternity leave:n/a    Father of baby employed:Yes  Length of paternity leave:4 weeks    Breast or formula feed:Breast and formula feed    Pediatrician:Dr. Millicent Morfin encouraged pt to schedule infant first appointment (usually within 48 hours of discharge) prior to pt discharge. Pt expressed understanding. Infant Insurance:Medicaid   [employer] [facility] contacted:Yes    Mental Health History: Pt endorses a hx of anxiety    Medications: Denied    Therapist:Hx, not current    Psychiatrist:Denied    SW discussed signs, symptoms and risks associated with post partum depression & anxiety. SW provided pt with PMAD resources. Other resources provided:[employer] transportation and mental health resources. [address] and [facility] area specific resources. Patient support system:FOB and extended family. Patient denied current questions/needs from SW.    SW/CM to remain available for support and/or discharge planning.       CHARISSA Verduzco, [facility]  Social Work   QMX:#[phone]

## 2023-11-27 NOTE — PLAN OF CARE
Problem: Patient Centered Care  Goal: Patient preferences are identified and integrated in the patient's plan of care  Description: Interventions:  - What would you like us to know as we care for you? We're having a baby girl!  - Provide timely, complete, and accurate information to patient/family  - Incorporate patient and family knowledge, values, beliefs, and cultural backgrounds into the planning and delivery of care  - Encourage patient/family to participate in care and decision-making at the level they choose  - Honor patient and family perspectives and choices  Outcome: Progressing     Problem: Patient/Family Goals  Goal: Patient/Family Long Term Goal  Description: Patient's Long Term Goal: Maintain pregnancy    Interventions:  - Multidisciplinary approach to provide holistic care  - Provide disease specific education and reinforcement throughout hospitalization     Outcome: Progressing  Goal: Patient/Family Short Term Goal  Description: Patient's Short Term Goal: Understand anticipatory needs of  at this gestational age    Interventions:   - Neonatology consult   - Lactation consult  Patient's Short Term Goal: Understand anticipatory needs of  at this gestational age    Interventions:   - Neonatology consult   - Lactation consult    Outcome: Progressing     Problem: POSTPARTUM  Goal: Long Term Goal:Experiences normal postpartum course  Description: INTERVENTIONS:  - Assess and monitor vital signs and lab values. - Assess fundus and lochia. - Provide ice/sitz baths for perineum discomfort. - Monitor healing of incision/episiotomy/laceration, and assess for signs and symptoms of infection and hematoma. - Assess bladder function and monitor for bladder distention.  - Provide/instruct/assist with pericare as needed. - Provide VTE prophylaxis as needed. - Monitor bowel function.  - Encourage ambulation and provide assistance as needed.   - Assess and monitor emotional status and provide social service/psych resources as needed. - Utilize standard precautions and use personal protective equipment as indicated. Ensure aseptic care of all intravenous lines and invasive tubes/drains.  - Obtain immunization and exposure to communicable diseases history. Outcome: Progressing  Goal: Optimize infant feeding at the breast  Description: INTERVENTIONS:  - Initiate breast feeding within first hour after birth. - Monitor effectiveness of current breast feeding efforts. - Assess support systems available to mother/family.  - Identify cultural beliefs/practices regarding lactation, letdown techniques, maternal food preferences. - Assess mother's knowledge and previous experience with breast feeding.  - Provide information as needed about early infant feeding cues (e.g., rooting, lip smacking, sucking fingers/hand) versus late cue of crying.  - Discuss/demonstrate breast feeding aids (e.g., infant sling, nursing footstool/pillows, and breast pumps). - Encourage mother/other family members to express feelings/concerns, and actively listen. - Educate father/SO about benefits of breast feeding and how to manage common lactation challenges. - Recommend avoidance of specific medications or substances incompatible with breast feeding.  - Assess and monitor for signs of nipple pain/trauma. - Instruct and provide assistance with proper latch. - Review techniques for milk expression (breast pumping) and storage of breast milk. Provide pumping equipment/supplies, instructions and assistance, as needed. - Encourage rooming-in and breast feeding on demand.  - Encourage skin-to-skin contact. - Provide LC support as needed. - Assess for and manage engorgement. - Provide breast feeding education handouts and information on community breast feeding support.    Outcome: Progressing  Goal: Establishment of adequate milk supply with medication/procedure interruptions  Description: INTERVENTIONS:  - Review techniques for milk expression (breast pumping). - Provide pumping equipment/supplies, instructions, and assistance until it is safe to breastfeed infant. Outcome: Progressing  Goal: Appropriate maternal -  bonding  Description: INTERVENTIONS:  - Assess caregiver- interactions. - Assess caregiver's emotional status and coping mechanisms. - Encourage caregiver to participate in  daily care. - Assess support systems available to mother/family.  - Provide /case management support as needed.   Outcome: Progressing

## 2023-11-27 NOTE — DISCHARGE SUMMARY
[facility]    Discharge Summary    [address] [facility] Patient Status:  Inpatient    10/22/1995 MRN [id number]   Location [facility]SKIRK Attending Jud Vyas CNM   Hosp Day # 1       Delivering OB Clinician: Haider Wood CNM    [facility]: Estimated Date of Delivery: 23    Gestational Age: 42w4d    Antepartum complications:   Patient Active Problem List   Diagnosis    Hypothyroidism affecting pregnancy, antepartum    Acute right lumbar radiculopathy    Mixed dyslipidemia    Sacroiliac joint dysfunction of right side    Anxiety    History of postpartum depression    Urinary tract infection in mother during first trimester of pregnancy    Pelvic pain in pregnancy    Pregnant    Fetal growth restriction antepartum    IUGR (intrauterine growth restriction) affecting care of mother    Anemia affecting pregnancy    Vaginal delivery       Date of Delivery: 2023  Time of Delivery: 5:42 AM     Delivery Type: spontaneous vaginal delivery    Baby: Liveborn female   Information for the patient's :  Ama Livingston, Girl [[id number]]   5 lb 0.1 oz (2.27 kg)   Apgars:  1 minute: 9   5 minutes: 9 10 minutes:       Intrapartum Complications: IUGR    Admit Date: 2023    Discharge Date: 2023    Hospital Course: No complications Routine delivery and postpartum care    Discharged Condition: stable    Disposition: home    Plan:     Follow-up appointment in 2 weeks with CHRIS Bradley CNM  2023  11:55 AM

## 2023-11-27 NOTE — LACTATION NOTE
LACTATION NOTE - MOTHER      Evaluation Type: Inpatient    Problems identified  Problems identified: Knowledge deficit;Milk supply not WNL; Unable to acheive sustained latch  Milk supply not WNL: Reduced (potential)  Problems Identified Other: Formula feeding with inconsistent pumping         Breastfeeding goal  Breastfeeding goal: To maintain breast milk feeding per patient goal    Maternal Assessment  Bilateral Breasts: Soft;Symmetrical  Bilateral Nipples: Colostrum easily expressed;Slightly everted/short  Prior breastfeeding experience (comment below): Multip; Unsuccessful  Prior BF experience: comment: low milk supply, colicky baby, maternal anxiety  Breastfeeding Assistance: Breastfeeding assistance provided with permission;Hand expression provided with permission         Guidelines for use of:  Breast pump type: Hand Pump (Has electric breast pump at home)  Suggested use of pump: Pump if infant is not latching to breast;Pump each time a supplement is offered  Other (comment): Discussed pumping recommendations and instructions for hand expression, mom able to return demonstration. Encouraged consistent pumping to protect milk supply and recommended follow up visit with Lactation.

## 2024-01-02 ENCOUNTER — OFFICE VISIT (OUTPATIENT)
Dept: FAMILY MEDICINE CLINIC | Facility: CLINIC | Age: 29
End: 2024-01-02

## 2024-01-02 ENCOUNTER — TELEPHONE (OUTPATIENT)
Dept: OBGYN UNIT | Facility: HOSPITAL | Age: 29
End: 2024-01-02

## 2024-01-02 VITALS
HEIGHT: 60 IN | SYSTOLIC BLOOD PRESSURE: 104 MMHG | DIASTOLIC BLOOD PRESSURE: 69 MMHG | BODY MASS INDEX: 27.09 KG/M2 | TEMPERATURE: 98 F | WEIGHT: 138 LBS | HEART RATE: 74 BPM

## 2024-01-02 DIAGNOSIS — H92.03 OTALGIA OF BOTH EARS: ICD-10-CM

## 2024-01-02 DIAGNOSIS — J02.9 SORE THROAT: Primary | ICD-10-CM

## 2024-01-02 LAB
CONTROL LINE PRESENT WITH A CLEAR BACKGROUND (YES/NO): YES YES/NO
KIT LOT #: NORMAL NUMERIC
STREP GRP A CUL-SCR: NEGATIVE

## 2024-01-02 PROCEDURE — 99203 OFFICE O/P NEW LOW 30 MIN: CPT | Performed by: FAMILY MEDICINE

## 2024-01-02 PROCEDURE — 3074F SYST BP LT 130 MM HG: CPT | Performed by: FAMILY MEDICINE

## 2024-01-02 PROCEDURE — 87880 STREP A ASSAY W/OPTIC: CPT | Performed by: FAMILY MEDICINE

## 2024-01-02 PROCEDURE — 3008F BODY MASS INDEX DOCD: CPT | Performed by: FAMILY MEDICINE

## 2024-01-02 PROCEDURE — 3078F DIAST BP <80 MM HG: CPT | Performed by: FAMILY MEDICINE

## 2024-01-02 NOTE — PROGRESS NOTES
1/2/2024  3:56 PM    Eric Blount is a 28 year old female.    Chief complaint(s):   Chief Complaint   Patient presents with    Ear Pain     X1 weeks , right ear , throat pain     Thyroid Problem     X2 weeks      HPI:     Eric Blount primary complaint is regarding as above.     Patient is a 28-year-old female who presents complaining of bilateral ear pain right worse than left as well as sore throat for the past week.  Denies any nasal congestion, fever, cough, chest pain, nausea vomiting or diarrhea.  Also having some headache as well.  Patient's last menstrual period was 03/11/2023 (exact date). S/p recent OB delivery.     HISTORY:  Past Medical History:   Diagnosis Date    Anemia     PP    Anxiety     in couseling, new diagnosis    Decorative tattoo     Fibroids     History of chicken pox     at age 3    History of hypothyroidism     In 1st pregnancy- TSH with NOB labs    Hypothyroidism     Postpartum anemia 03/05/2021    Postpartum depression 04/02/2021    Tuberculosis contact     mother positive 2020      No past surgical history on file.   Family History   Problem Relation Age of Onset    Diabetes Maternal Grandfather         type 2      Social History:   Social History     Socioeconomic History    Marital status: Single     Spouse name: Cesar Reyes    Number of children: 1    Years of education: 12    Highest education level: High school graduate   Occupational History    Occupation: Works at gym     Comment: part time   Tobacco Use    Smoking status: Never    Smokeless tobacco: Never   Substance and Sexual Activity    Alcohol use: Not Currently    Drug use: Never   Other Topics Concern     Service No    Blood Transfusions No    Caffeine Concern No    Occupational Exposure No    Hobby Hazards No    Stress Concern No    Special Diet No    Exercise Yes     Comment: walking    Seat Belt Yes   Social History Narrative    The patient does not use an assistive device..      The patient  does live in a home with stairs.     Social Determinants of Health     Financial Resource Strain: Low Risk  (11/25/2023)    Financial Resource Strain     Difficulty of Paying Living Expenses: Not hard at all     Med Affordability: No   Food Insecurity: No Food Insecurity (11/25/2023)    Food Insecurity     Food Insecurity: Never true   Transportation Needs: No Transportation Needs (11/25/2023)    Transportation Needs     Lack of Transportation: No   Stress: No Stress Concern Present (11/25/2023)    Stress     Feeling of Stress : No   Housing Stability: Low Risk  (11/25/2023)    Housing Stability     Housing Instability: No        Immunizations:   Immunization History   Administered Date(s) Administered    FLU VAC QIV SPLIT 3 YRS AND OLDER (63781) 12/11/2018    FLUZONE 6 months and older PFS 0.5 ml (44676) 10/26/2020, 10/02/2023    HEP A,Ped/Adol,(2 Dose) 12/02/2013    HPV (Gardasil) 12/02/2013    MMR 01/08/2017    TDAP 12/11/2018, 01/04/2021, 10/02/2023       Medications (Active prior to today's visit):  Current Outpatient Medications   Medication Sig Dispense Refill    Omega-3 Fatty Acids (FISH OIL OR) Take by mouth.      levothyroxine 25 MCG Oral Tab Take 1 tablet (25 mcg total) by mouth before breakfast. (Patient not taking: Reported on 1/2/2024) 30 tablet 0    ibuprofen 600 MG Oral Tab Take 1 tablet (600 mg total) by mouth every 6 (six) hours as needed for Pain. (Patient not taking: Reported on 1/2/2024) 30 tablet 0    Ferrous Gluconate 324 (38 Fe) MG Oral Tab Take 1 tablet (325 mg total) by mouth daily with breakfast. (Patient not taking: Reported on 1/2/2024)      prenatal vitamin with DHA 27-0.8-228 MG Oral Cap Take 1 capsule by mouth daily. (Patient not taking: Reported on 1/2/2024)      COLLAGEN OR Take by mouth. (Patient not taking: Reported on 1/2/2024)         Allergies:  No Known Allergies      ROS:   Review of Systems   Constitutional:  Negative for appetite change and fever.   HENT:  Positive for ear  pain and sore throat. Negative for congestion.    Eyes:  Negative for visual disturbance.   Respiratory:  Negative for cough and shortness of breath.    Cardiovascular:  Negative for chest pain.   Gastrointestinal:  Negative for abdominal pain, nausea and vomiting.   Musculoskeletal:  Negative for back pain.   Skin:  Negative for rash.   Neurological:  Positive for headaches. Negative for dizziness.       PHYSICAL EXAM:   VS: /69   Pulse 74   Temp 98.4 °F (36.9 °C)   Ht 5' (1.524 m)   Wt 138 lb (62.6 kg)   LMP 03/11/2023 (Exact Date)   BMI 26.95 kg/m²     Physical Exam  Vitals reviewed.   Constitutional:       General: She is not in acute distress.     Appearance: Normal appearance.   HENT:      Head: Normocephalic.      Right Ear: Tympanic membrane and ear canal normal.      Left Ear: Tympanic membrane and ear canal normal.      Nose: Nose normal.      Mouth/Throat:      Pharynx: Posterior oropharyngeal erythema present.   Eyes:      Conjunctiva/sclera: Conjunctivae normal.   Cardiovascular:      Rate and Rhythm: Normal rate and regular rhythm.   Pulmonary:      Effort: Pulmonary effort is normal.      Breath sounds: Normal breath sounds.   Musculoskeletal:      Cervical back: Neck supple.   Skin:     Findings: No rash.   Psychiatric:         Mood and Affect: Mood normal.         LABORATORY RESULTS:   No results found for: \"URCOLOR\", \"URCLA\", \"URINELEUK\", \"URINENITRITE\", \"URINEBLOOD\"   Results for orders placed or performed in visit on 01/02/24   Strep A Assay W/Optic   Result Value Ref Range    Strep Grp A Screen negative Negative    Control Line Present with a clear background (yes/no) yes Yes/No    Kit Lot # [id number] Numeric    Kit Expiration Date 04/14/2025 Date       EKG / Spirometry : -     Radiology: No results found.     ASSESSMENT/PLAN:   Assessment   Encounter Diagnoses   Name Primary?    Sore throat Yes    Otalgia of both ears        MEDICATIONS:   Acetaminophen prn          LABORATORY &  ORDERS:   Orders Placed This Encounter   Procedures    Strep A Assay W/Optic     RECOMMENDATIONS given include: Patient was reassured of  her medical condition and all questions and concerns were answered. Patient was informed to please, call our office with any new or further questions or concerns that may come up in the near future. Notify Dr Estrada or the [facility] if there is a deterioration or worsening of the medical condition. Also, inform the doctor with any new symptoms or medications' side effects.      FOLLOW-UP: Schedule a follow-up visit in  prn.            Orders This Visit:  Orders Placed This Encounter   Procedures    Strep A Assay W/Optic       Meds This Visit:  Requested Prescriptions      No prescriptions requested or ordered in this encounter       Imaging & Referrals:  None         MISTI ESTRADA MD

## 2024-01-08 ENCOUNTER — POSTPARTUM (OUTPATIENT)
Dept: OBGYN CLINIC | Facility: CLINIC | Age: 29
End: 2024-01-08

## 2024-01-08 ENCOUNTER — LAB ENCOUNTER (OUTPATIENT)
Dept: LAB | Facility: HOSPITAL | Age: 29
End: 2024-01-08
Attending: ADVANCED PRACTICE MIDWIFE
Payer: MEDICAID

## 2024-01-08 VITALS
HEART RATE: 99 BPM | DIASTOLIC BLOOD PRESSURE: 73 MMHG | WEIGHT: 137 LBS | SYSTOLIC BLOOD PRESSURE: 106 MMHG | BODY MASS INDEX: 27 KG/M2

## 2024-01-08 DIAGNOSIS — R53.83 FATIGUE, UNSPECIFIED TYPE: ICD-10-CM

## 2024-01-08 DIAGNOSIS — E03.9 HYPOTHYROIDISM, UNSPECIFIED TYPE: ICD-10-CM

## 2024-01-08 DIAGNOSIS — M25.551 PAIN OF RIGHT HIP: ICD-10-CM

## 2024-01-08 LAB
DEPRECATED RDW RBC AUTO: 45.9 FL (ref 35.1–46.3)
ERYTHROCYTE [DISTWIDTH] IN BLOOD BY AUTOMATED COUNT: 13.5 % (ref 11–15)
HCT VFR BLD AUTO: 36.6 %
HGB BLD-MCNC: 12.1 G/DL
MCH RBC QN AUTO: 30.3 PG (ref 26–34)
MCHC RBC AUTO-ENTMCNC: 33.1 G/DL (ref 31–37)
MCV RBC AUTO: 91.5 FL
PLATELET # BLD AUTO: 207 10(3)UL (ref 150–450)
RBC # BLD AUTO: 4 X10(6)UL
T4 FREE SERPL-MCNC: 1.3 NG/DL (ref 0.8–1.7)
TSI SER-ACNC: 2.69 MIU/ML (ref 0.55–4.78)
WBC # BLD AUTO: 4.8 X10(3) UL (ref 4–11)

## 2024-01-08 PROCEDURE — 84443 ASSAY THYROID STIM HORMONE: CPT

## 2024-01-08 PROCEDURE — 84439 ASSAY OF FREE THYROXINE: CPT

## 2024-01-08 PROCEDURE — 36415 COLL VENOUS BLD VENIPUNCTURE: CPT

## 2024-01-08 PROCEDURE — 85027 COMPLETE CBC AUTOMATED: CPT

## 2024-01-08 NOTE — PROGRESS NOTES
Chief Complaint   Patient presents with    Postpartum Care     6 wk PP visit.  . GAD7=3          HPI:   Eric is 28 year old , here today for 6-week postpartum visit.  Type and date of Delivery: vaginal, Complications: uncomplicated    Has right hip pain. Feels it when lying on that side or sitting in certain positions.  See Delivery Summary for baby's gender and weight  Perineum: healing well. Bleeding stopped  Feeding Method: bottle/formula. Baby gaining well  Bonding: bonding well  Maternal sleep: 5 hours/night. Napping: no  Sexual activity resumed: none. Contraceptive Method: pills  Postpartum Depression screen: EPDS=4; GAD7=3  Feels tired but moods are stable. Baby not sleeping at night. Sleeping well during the day. She typically will go to sleep with baby in the morning and sleep until afternoon    She states she had stopped taking her vitamins and levothyroxine for a while as she just kept forgetting. Recently restarted.    Denies history of migraine with aura or personal/family history of blood clots/stroke    HISTORY:  Patient Active Problem List   Diagnosis    Hypothyroidism affecting pregnancy, antepartum    Acute right lumbar radiculopathy    Mixed dyslipidemia    Sacroiliac joint dysfunction of right side    Anxiety    History of postpartum depression    Urinary tract infection in mother during first trimester of pregnancy    Pelvic pain in pregnancy    Pregnant    Fetal growth restriction antepartum    IUGR (intrauterine growth restriction) affecting care of mother    Anemia affecting pregnancy    Vaginal delivery       Wt Readings from Last 6 Encounters:   24 137 lb (62.1 kg)   24 138 lb (62.6 kg)   23 145 lb (65.8 kg)   23 145 lb (65.8 kg)   23 144 lb (65.3 kg)   23 145 lb 2 oz (65.8 kg)       30 lb (13.6 kg)    Medications (Active prior to today's visit):  Current Outpatient Medications   Medication Sig Dispense Refill    levothyroxine 25 MCG  Oral Tab Take 1 tablet (25 mcg total) by mouth before breakfast. 30 tablet 0    COLLAGEN OR Take by mouth.      Omega-3 Fatty Acids (FISH OIL OR) Take by mouth.      ibuprofen 600 MG Oral Tab Take 1 tablet (600 mg total) by mouth every 6 (six) hours as needed for Pain. (Patient not taking: Reported on 1/2/2024) 30 tablet 0    Ferrous Gluconate 324 (38 Fe) MG Oral Tab Take 1 tablet (325 mg total) by mouth daily with breakfast. (Patient not taking: Reported on 1/2/2024)      prenatal vitamin with DHA 27-0.8-228 MG Oral Cap Take 1 capsule by mouth daily. (Patient not taking: Reported on 1/2/2024)         Allergies:   No Known Allergies    PHYSICAL EXAM:   Vitals:    01/08/24 1430   BP: 106/73   Pulse: 99     Physical Exam  Vitals and nursing note reviewed.   Constitutional:       General: She is not in acute distress.     Appearance: Normal appearance. She is not ill-appearing, toxic-appearing or diaphoretic.   Cardiovascular:      Pulses: Normal pulses.   Pulmonary:      Effort: Pulmonary effort is normal.   Abdominal:      General: Abdomen is flat.      Palpations: Abdomen is soft.      Comments: No separation of abdominal muscles   Genitourinary:     General: Normal vulva.      Labia:         Right: No rash, tenderness, lesion or injury.         Left: No rash, tenderness, lesion or injury.       Comments: Laceration well healed  Neurological:      Mental Status: She is alert and oriented to person, place, and time.   Psychiatric:         Mood and Affect: Mood normal.         Behavior: Behavior normal.         Thought Content: Thought content normal.         Judgment: Judgment normal.          Recent Results (from the past 24 hour(s))   CBC, Platelet; No Differential    Collection Time: 01/08/24  3:18 PM   Result Value Ref Range    WBC 4.8 4.0 - 11.0 x10(3) uL    RBC 4.00 3.80 - 5.30 x10(6)uL    HGB 12.1 12.0 - 16.0 g/dL    HCT 36.6 35.0 - 48.0 %    MCV 91.5 80.0 - 100.0 fL    MCH 30.3 26.0 - 34.0 pg    MCHC 33.1  31.0 - 37.0 g/dL    RDW 13.5 11.0 - 15.0 %    RDW-SD 45.9 35.1 - 46.3 fL    .0 150.0 - 450.0 10(3)uL   TSH and Free T4    Collection Time: 01/08/24  3:18 PM   Result Value Ref Range    Free T4 1.3 0.8 - 1.7 ng/dL    TSH 2.689 0.550 - 4.780 mIU/mL          ASSESSMENT/PLAN:   Eric was seen today for postpartum care.    Diagnoses and all orders for this visit:    Postpartum care and examination    Pain of right hip  -     Physical Therapy Referral - [facility] [place]    Hypothyroidism, unspecified type  -     TSH and Free T4; Future    Fatigue, unspecified type  -     CBC, Platelet; No Differential; Future       Discussed reaching out to pediatrician for tips on day/night confusion for baby.  TSH and CBC ordered  Recommended reaching out to therapists to help with transition  PT referral placed for hip pain  OCPs ordered. Reviewed warning signs, including ACHES    Next annual due: 3 months  Follow-up/Return to clinic: 3 months for annual    Counseling:   Postpartum teaching including maternal and family adaptation, sleep/rest strategies, lactation and weaning (if applicable), sexuality/contraception, importance of Kegel's and abdominal exercises, continuation of prenatal vitamins, and signs/symptoms of postpartum depression  Patient verbalized understanding, All questions answered. No barriers to learning identified

## 2024-01-09 PROBLEM — Z34.90 PREGNANT (HCC): Status: RESOLVED | Noted: 2023-11-18 | Resolved: 2024-01-09

## 2024-01-09 PROBLEM — Z34.90 PREGNANT: Status: RESOLVED | Noted: 2023-11-18 | Resolved: 2024-01-09

## 2024-01-09 RX ORDER — LEVONORGESTREL AND ETHINYL ESTRADIOL 0.1-0.02MG
1 KIT ORAL DAILY
Qty: 28 TABLET | Refills: 11 | Status: SHIPPED | OUTPATIENT
Start: 2024-01-09 | End: 2025-01-08

## 2024-01-09 NOTE — PATIENT INSTRUCTIONS
Midwifery and Women's Health  6 Weeks Postpartum and beyond    Congratulations on your beautiful baby!  You have probably had many joyful moments (and maybe some challenging ones) in the first 6 weeks of your baby's life.  The first year postpartum brings many changes physically and emotionally, and can also be a time when women struggle to find time to care for themselves and their own health.     The following are a few suggestions to help you achieve optimal health.  Remember that some days are easier than others when you are caring for a little person, and this is not meant to be a TO DO list that you must tackle immediately!  Be gentle with yourself, and try to take little steps to tend to your physical and mental health in the months ahead  1.  Try to incorporate 20-30 minutes of physical activity into every day.  Fast walking, yoga, swimming, or jumping rope at home are all great examples.  The World Health Organization recommends at least 150 minutes of moderate aerobic exercise per week.   Exercise also releases endorphins, which promote improved mood.  2.  Muscle strength matters   Strengthening of your core and pelvic floor is important, and can prevent problems in the future such as loss of urine or feces, uterine prolapse and chronic back pain.  We can provide you with additional guidance about exercises to help with these muscle groups.   Some women benefit from physical therapy.  3.  Expect weight loss to happen slowly, over the course of the first postpartum year.  The rate of weight loss is different for every woman, depending on whether one is breastfeeding, level of physical activity, and metabolic rate.  Ideally a woman should attain a Body Mass Index (BMI) or 25 or less before planning her next pregnancy, but even a modest loss of 10% for overweight women is beneficial.     4.  Talk to your midwife about any health conditions you may need to address before considering another pregnancy.   Certain complications that occur during a pregnancy can cause long-term health problems or can recur in future pregnancies.  Some of these can be prevented by taking action BEFORE the next pregnancy.  If you had diabetes, blood pressure issues, anemia, or obesity in your pregnancy, make a plan with the midwife about what to do at this point to minimize risks in the future.  5.  Catch up on your vaccines.  Talk to the midwife about catching up on your HPV vaccine series if you have not completed this, and if seasonally appropriate, get your FLU vaccine.     6.  Consider your long term family plan and pregnancy spacing.   Research has shown that waiting at least 18 months from the birth of one child to the next pregnancy results in healthier, lower risk pregnancies.  Remember that you will probably ovulate and be fertile BEFORE your first period returns.  Talk to the midwives about the best family planning method for you... There is something for everyone.  The midwives are here for you in the weeks, months, and years to come!     ORAL CONTRACEPTIVE PILL INSTRUCTION    The name of my oral contraceptive pill is Lutera    When do I start my “pills”?  On the day your period starts  The Sunday after your period starts whether or not your period has already stopped.  If your period begins on Sunday, take your first pill that day.  5 days after your period begins.    What time should I take my pill?  Take your pill the same time every day.  This will improve it’s effectiveness and will help you remember to take your pill daily.’  If you have nausea when taking your pill, take it at bedtime with some food    What kind of side effects can occur and what can I do about them?  Nausea - take the pill with food and eat a bit more in an hour. Take the pill at bedtime with a snack.  Bloating, breast tenderness - decrease caffeine intake, especially during the third week of pills. Take a multivitamin daily.  Bleeding between periods  - take the pill at the same time daily and use a back-up method of contraception.  Some spotting or breakthrough bleeding during the first 3 months is normal.  If bleeding persists after the first three months, call the office.  Mood changes - take a multivitamin daily.  If persists past the third pack of pills discuss this with your health care provider.    What can decrease the effectiveness of the pill  Forgetting to take the pill.  An illness that causes vomiting and/or diarrhea.  If you vomit within 3 hours of taking your pill, take another pill from your pack and call the office to get an additional pack of pills.  Some medications - antibiotics, anticonvulsants, sedatives, antidepressants, and [place]’s Wort    What should I do if I forget to take my pills  One pill - take as soon as you remember if not until the next day take two pills that day.  Two pills - take 2 pills a day for 2 days in a row, by the third day you will be back on schedule  If you miss pills you should use a back-up method of contraception, such as condoms until your next pack of pills.  If you miss more than 2 pills. Call the office for instructions.    When should I use additional contraception?  Use a back-up with the first pack of pills  Use a back-up if you miss more than one pill in a pack  Use a back-up if you have bleeding during your active pills  Use a back-up for safe sexual practice, to prevent disease  Use a back-up if you are taking a medication that decreases the pills effectiveness    DANGER SIGNS - CALL THE OFFICE [phone]   A - Abdominal pain (severe)  C - Chest pain (severe)  H - Headaches (severe)  E - Eye problems (loss of vision, blurring of vision)  S - Severe leg pain in the calf or thigh

## 2024-01-12 RX ORDER — LEVOTHYROXINE SODIUM 0.03 MG/1
25 TABLET ORAL
Qty: 90 TABLET | Refills: 0 | Status: SHIPPED | OUTPATIENT
Start: 2024-01-12

## 2024-03-25 ENCOUNTER — TELEPHONE (OUTPATIENT)
Dept: ADMINISTRATIVE | Age: 29
End: 2024-03-25

## 2024-03-27 NOTE — TELEPHONE ENCOUNTER
Pt spouse called to ask questions regarding how to fill out RACHELLE. I walked pt spouse through filling out the form. Provided pt spouse with our fax and email.

## 2024-03-27 NOTE — TELEPHONE ENCOUNTER
Patient called asking questions on how to fill out RACHELLE. Explained thoroughly to him. He said he understands.

## 2024-04-02 NOTE — TELEPHONE ENCOUNTER
LYNN Zimmerman -     Your patient's spouse, Cesar Reyes, is requesting intermittent FMLA to care for mother and child - starting 2/8/2024, ending 12/31/2024 - 1-2 days a week.     Do you support this request? If not, what are your suggestions?    Thank you for your time and assistance!    Clarisa Loya Department

## 2024-04-10 NOTE — TELEPHONE ENCOUNTER
Harris CHAVIRA,      Per FMLA guidelines, the spouse of the patient is eligible to utilize the 12 wks of paternity leave continuously or intermittently. In this case, pt spouse is requesting 1-12 wks of paternity leave to care for the pt/child on an intermittent basis to transport the child/patient to and from app, and take time off as needed.     The below screenshot indicates that intermittent fmla can be used as long as the employer approves; it would not be for flare ups.     Would you reconsider your approval based upon the information below?     Thank you,  Clarisa Loya Department

## 2024-04-15 ENCOUNTER — PATIENT MESSAGE (OUTPATIENT)
Dept: OBGYN CLINIC | Facility: CLINIC | Age: 29
End: 2024-04-15

## 2024-04-16 ENCOUNTER — TELEPHONE (OUTPATIENT)
Dept: OBGYN CLINIC | Facility: CLINIC | Age: 29
End: 2024-04-16

## 2024-04-16 NOTE — TELEPHONE ENCOUNTER
Phone call to patient. No answer. LMTCB    ----- Message from Maddison Montalvo RN sent at 4/15/2024  1:24 PM CDT -----  Regarding: FW: CAS  Contact: [phone]    ----- Message -----  From: Eric Delarosa  Sent: 4/15/2024   1:23 PM CDT  To: [facility] Clinical Pool  Subject: CAS                                             Good afternoon, Olya          I hope you’re doing great. I’ve been trying to reach you via phone call about some [facility] form for my fiancé, if you could give me a call back at [phone] or if you could send a message back through my chart. I would really appreciate. Thank you!

## 2024-04-16 NOTE — TELEPHONE ENCOUNTER
Phone call to patient. She states that baby is in the midst of a sleep regression and it has been stressful on both her and her partner due to sleep deprivation. I informed her that I am currently waiting for end date of intermittent leave to be changed to 11/26/24 since it has to be within first year. I will then sign forms.

## 2024-04-18 NOTE — TELEPHONE ENCOUNTER
LYNN Arora -      Please sign off on this form if you agree to:      Intermittent FMLA - Paternity       Startin2024       Endin2024     (place your signature on the first page only)     -From your Inbasket, Highlight the patient and click \"Chart\"   -Double click the 3/25/2024 Forms Completion telephone encounter  -Scroll down to the Media section   -Click the blue Hyperlinks: Paternity FMLA #2 / LYNN Lopez / 2024  -Click Acknowledge located in the top right ribbon/menu (it  has been moved)  -Drag the mouse into the blank space of the document and a + sign will   appear. Left click to electronically sign the document.     Thank you for your time and assistance!     Clarisa Loya Department

## 2024-04-19 NOTE — TELEPHONE ENCOUNTER
Paternity FMLA forms have been faxed successful, and I have sent PT a Sendiat message for spouse's behalf.

## 2024-04-19 NOTE — TELEPHONE ENCOUNTER
Form Status:    Paternity FMLA forms have been faxed to [employer] - fax #: [phone], and now, awaiting fax confirmation.

## 2024-05-06 ENCOUNTER — LAB ENCOUNTER (OUTPATIENT)
Dept: LAB | Age: 29
End: 2024-05-06
Payer: MEDICAID

## 2024-05-06 ENCOUNTER — OFFICE VISIT (OUTPATIENT)
Dept: FAMILY MEDICINE CLINIC | Facility: CLINIC | Age: 29
End: 2024-05-06

## 2024-05-06 VITALS
WEIGHT: 131.38 LBS | BODY MASS INDEX: 25.79 KG/M2 | HEART RATE: 96 BPM | HEIGHT: 60 IN | SYSTOLIC BLOOD PRESSURE: 113 MMHG | DIASTOLIC BLOOD PRESSURE: 75 MMHG

## 2024-05-06 DIAGNOSIS — L60.0 INGROWN TOENAIL OF RIGHT FOOT: ICD-10-CM

## 2024-05-06 DIAGNOSIS — E03.9 HYPOTHYROIDISM AFFECTING PREGNANCY, ANTEPARTUM (HCC): ICD-10-CM

## 2024-05-06 DIAGNOSIS — B35.3 TINEA PEDIS OF RIGHT FOOT: ICD-10-CM

## 2024-05-06 DIAGNOSIS — Z00.00 ENCOUNTER FOR WELLNESS EXAMINATION IN ADULT: Primary | ICD-10-CM

## 2024-05-06 DIAGNOSIS — F41.9 ANXIETY: ICD-10-CM

## 2024-05-06 DIAGNOSIS — O99.280 HYPOTHYROIDISM AFFECTING PREGNANCY, ANTEPARTUM (HCC): ICD-10-CM

## 2024-05-06 DIAGNOSIS — Z00.00 ENCOUNTER FOR WELLNESS EXAMINATION IN ADULT: ICD-10-CM

## 2024-05-06 LAB
ALBUMIN SERPL-MCNC: 4.3 G/DL (ref 3.2–4.8)
ALBUMIN/GLOB SERPL: 1.4 {RATIO} (ref 1–2)
ALP LIVER SERPL-CCNC: 54 U/L
ALT SERPL-CCNC: 14 U/L
ANION GAP SERPL CALC-SCNC: 8 MMOL/L (ref 0–18)
AST SERPL-CCNC: 23 U/L (ref ?–34)
BASOPHILS # BLD AUTO: 0.01 X10(3) UL (ref 0–0.2)
BASOPHILS NFR BLD AUTO: 0.2 %
BILIRUB SERPL-MCNC: 0.3 MG/DL (ref 0.3–1.2)
BUN BLD-MCNC: 12 MG/DL (ref 9–23)
BUN/CREAT SERPL: 12.6 (ref 10–20)
CALCIUM BLD-MCNC: 9.1 MG/DL (ref 8.7–10.4)
CHLORIDE SERPL-SCNC: 109 MMOL/L (ref 98–112)
CO2 SERPL-SCNC: 27 MMOL/L (ref 21–32)
CREAT BLD-MCNC: 0.95 MG/DL
DEPRECATED RDW RBC AUTO: 41.2 FL (ref 35.1–46.3)
EGFRCR SERPLBLD CKD-EPI 2021: 84 ML/MIN/1.73M2 (ref 60–?)
EOSINOPHIL # BLD AUTO: 0.09 X10(3) UL (ref 0–0.7)
EOSINOPHIL NFR BLD AUTO: 2.1 %
ERYTHROCYTE [DISTWIDTH] IN BLOOD BY AUTOMATED COUNT: 12.1 % (ref 11–15)
EST. AVERAGE GLUCOSE BLD GHB EST-MCNC: 97 MG/DL (ref 68–126)
FASTING STATUS PATIENT QL REPORTED: NO
GLOBULIN PLAS-MCNC: 3.1 G/DL (ref 2–3.5)
GLUCOSE BLD-MCNC: 86 MG/DL (ref 70–99)
HBA1C MFR BLD: 5 % (ref ?–5.7)
HCT VFR BLD AUTO: 38 %
HGB BLD-MCNC: 12.7 G/DL
IMM GRANULOCYTES # BLD AUTO: 0 X10(3) UL (ref 0–1)
IMM GRANULOCYTES NFR BLD: 0 %
LYMPHOCYTES # BLD AUTO: 1.2 X10(3) UL (ref 1–4)
LYMPHOCYTES NFR BLD AUTO: 28 %
MCH RBC QN AUTO: 31.2 PG (ref 26–34)
MCHC RBC AUTO-ENTMCNC: 33.4 G/DL (ref 31–37)
MCV RBC AUTO: 93.4 FL
MONOCYTES # BLD AUTO: 0.48 X10(3) UL (ref 0.1–1)
MONOCYTES NFR BLD AUTO: 11.2 %
NEUTROPHILS # BLD AUTO: 2.51 X10 (3) UL (ref 1.5–7.7)
NEUTROPHILS # BLD AUTO: 2.51 X10(3) UL (ref 1.5–7.7)
NEUTROPHILS NFR BLD AUTO: 58.5 %
OSMOLALITY SERPL CALC.SUM OF ELEC: 297 MOSM/KG (ref 275–295)
PLATELET # BLD AUTO: 230 10(3)UL (ref 150–450)
POTASSIUM SERPL-SCNC: 4.4 MMOL/L (ref 3.5–5.1)
PROT SERPL-MCNC: 7.4 G/DL (ref 5.7–8.2)
RBC # BLD AUTO: 4.07 X10(6)UL
SODIUM SERPL-SCNC: 144 MMOL/L (ref 136–145)
TSI SER-ACNC: 1.69 MIU/ML (ref 0.55–4.78)
WBC # BLD AUTO: 4.3 X10(3) UL (ref 4–11)

## 2024-05-06 PROCEDURE — 99385 PREV VISIT NEW AGE 18-39: CPT

## 2024-05-06 PROCEDURE — 83036 HEMOGLOBIN GLYCOSYLATED A1C: CPT

## 2024-05-06 PROCEDURE — 84443 ASSAY THYROID STIM HORMONE: CPT

## 2024-05-06 PROCEDURE — 80053 COMPREHEN METABOLIC PANEL: CPT

## 2024-05-06 PROCEDURE — 85025 COMPLETE CBC W/AUTO DIFF WBC: CPT

## 2024-05-06 PROCEDURE — 36415 COLL VENOUS BLD VENIPUNCTURE: CPT

## 2024-05-06 RX ORDER — CLOTRIMAZOLE 1 %
1 CREAM (GRAM) TOPICAL 2 TIMES DAILY
Qty: 60 G | Refills: 1 | Status: SHIPPED | OUTPATIENT
Start: 2024-05-06 | End: 2024-05-20

## 2024-05-06 NOTE — PROGRESS NOTES
Subjective:   Eric Blount is a 28 year old female who presents for Physical (Felt a lump right side feel swollen when I press it not sure if its normal, had thyroid problem on both pregnancys,noticed last night)   Patient is a pleasant 28-year-old female past medical history consistent for anxiety, fibroids, postpartum anemia, and hypothyroidism.  Patient presents to office today for physical.  Patient states her health is overall pretty good.    Diet: Pretty good. Cooks at home. Varies with food. Will eat out on the weekends. Wants to add more fiber and veggies.   Exercise: Starting to work out more. Baby is 5 months, Fiance works overnights. Has been doing cardio, wants to add weights. Recently did fast to lose weight.   Sleep: She has been staying up to have time to herself. Aware she needs to make changes.   Stress: Has stress. Stressed about weight. Cries when she gets stressed.   Social: Engaged, 2 kids 5 months and and 3 years, lives in [facility]. 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  Sexually Active: too scared. Doesn't want to get pregnant. Does not like BC. Spoke with about IUD.   Prophylaxis: Condoms if she was to be active.   Alcohol: seldom on weekends.  Tobacco: No, never.   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 Was part time at [employer].   Vaccinations: N/A  Pap: 2020          Past Medical History:    Anemia    PP    Anxiety    in couseling, new diagnosis    Decorative tattoo    Fibroids    History of chicken pox    at age 3    History of hypothyroidism    In 1st pregnancy- TSH with NOB labs    Hypothyroidism    Postpartum anemia (HCC)    Postpartum depression    Tuberculosis contact    mother positive 2020      History reviewed. No pertinent surgical history.     History/Other:    Chief Complaint Reviewed and Verified  Nursing Notes Reviewed and   Verified  Tobacco Reviewed  Allergies Reviewed  Medications Reviewed    Problem List Reviewed  Medical History Reviewed   Surgical History   Reviewed  OB Status Reviewed  Family History Reviewed  Social History   Reviewed         Tobacco:  She has never smoked tobacco.    Current Outpatient Medications   Medication Sig Dispense Refill    clotrimazole 1 % External Cream Apply 1 Application topically 2 (two) times daily for 14 days. 60 g 1    COLLAGEN OR Take by mouth.      Omega-3 Fatty Acids (FISH OIL OR) Take by mouth.      levothyroxine 25 MCG Oral Tab Take 1 tablet (25 mcg total) by mouth before breakfast. (Patient not taking: Reported on 5/6/2024) 90 tablet 0    Levonorgestrel-Ethinyl Estrad (LUTERA) 0.1-20 MG-MCG Oral Tab Take 1 tablet by mouth daily. (Patient not taking: Reported on 5/6/2024) 28 tablet 11    ibuprofen 600 MG Oral Tab Take 1 tablet (600 mg total) by mouth every 6 (six) hours as needed for Pain. (Patient not taking: Reported on 1/2/2024) 30 tablet 0    Ferrous Gluconate 324 (38 Fe) MG Oral Tab Take 1 tablet (325 mg total) by mouth daily with breakfast. (Patient not taking: Reported on 1/2/2024)      prenatal vitamin with DHA 27-0.8-228 MG Oral Cap Take 1 capsule by mouth daily. (Patient not taking: Reported on 1/2/2024)           Review of Systems:  Review of Systems   Constitutional: Negative.  Negative for activity change and appetite change.   HENT: Negative.  Negative for congestion, postnasal drip, rhinorrhea, sore throat, tinnitus and voice change.    Eyes: Negative.    Respiratory: Negative.  Negative for apnea, cough, chest tightness and shortness of breath.    Cardiovascular:  Negative for chest pain and leg swelling.   Gastrointestinal: Negative.  Negative for abdominal pain, anal bleeding and blood in stool.   Genitourinary: Negative.  Negative for difficulty urinating, flank pain and menstrual problem.   Musculoskeletal: Negative.  Negative for joint swelling.   Skin:  Positive for rash.   Neurological: Negative.  Negative for dizziness and headaches.   Psychiatric/Behavioral: Negative.  Negative  for agitation.          Objective:   /75 (BP Location: Left arm, Patient Position: Sitting, Cuff Size: adult)   Pulse 96   Ht 5' (1.524 m)   Wt 131 lb 6.4 oz (59.6 kg)   LMP 05/04/2024 (Exact Date)   BMI 25.66 kg/m²  Estimated body mass index is 25.66 kg/m² as calculated from the following:    Height as of this encounter: 5' (1.524 m).    Weight as of this encounter: 131 lb 6.4 oz (59.6 kg).  Physical Exam  Vitals and nursing note reviewed.   Constitutional:       General: She is not in acute distress.     Appearance: Normal appearance. She is well-developed and normal weight.   HENT:      Head: Normocephalic and atraumatic.      Right Ear: Tympanic membrane, ear canal and external ear normal. There is no impacted cerumen.      Left Ear: Tympanic membrane, ear canal and external ear normal. There is no impacted cerumen.      Nose: Nose normal. No congestion or rhinorrhea.      Mouth/Throat:      Mouth: Mucous membranes are moist.      Pharynx: Oropharynx is clear. Posterior oropharyngeal erythema present. No oropharyngeal exudate.   Eyes:      Conjunctiva/sclera: Conjunctivae normal.      Pupils: Pupils are equal, round, and reactive to light.   Neck:      Thyroid: No thyromegaly.   Cardiovascular:      Rate and Rhythm: Normal rate and regular rhythm.      Pulses: Normal pulses.      Heart sounds: Normal heart sounds. No murmur heard.  Pulmonary:      Effort: Pulmonary effort is normal. No respiratory distress.      Breath sounds: Normal breath sounds. No wheezing or rales.   Chest:      Chest wall: No tenderness.   Abdominal:      General: Bowel sounds are normal. There is no distension.      Palpations: Abdomen is soft.      Tenderness: There is no abdominal tenderness.   Musculoskeletal:         General: No tenderness. Normal range of motion.      Cervical back: Normal range of motion and neck supple.   Feet:      Right foot:      Toenail Condition: Right toenails are abnormally thick and ingrown.       Comments: Tinea pedis between toes   Lymphadenopathy:      Cervical: No cervical adenopathy.   Skin:     General: Skin is warm and dry.      Capillary Refill: Capillary refill takes less than 2 seconds.      Findings: No rash.   Neurological:      Mental Status: She is alert and oriented to person, place, and time.      Coordination: Coordination normal.   Psychiatric:         Behavior: Behavior normal.         Thought Content: Thought content normal.         Judgment: Judgment normal.           Assessment & Plan:   1. Encounter for wellness examination in adult (Primary)  Assessment & Plan:  Wellness labs ordered.  Encouraged increasing physical activity which includes raising heart rate 3 to 5 days/week for at least 30 minutes at a time, while also performing mild strength exercises.  Patient counseled on importance of dietary modifications, which may include limiting fats, red meat, and carbohydrates, while increasing fruit and vegetable intake, and trying to adhere to a low sodium/Mediterranean diet.  Encouraged to manage stress.  Encouraged good sleep habits.  Encouraged good sexual health.    Patient aware of plan of care. All questions answered to satisfaction of the patient. Patient instructed to call office or reach out via [facility] if any issues arise. For urgent issues and/or reviewed red flags please proceed to the urgent care or ER.  Also, inform the nurse practitioner with any new symptoms or medication side effects.      Orders:  -     Hemoglobin A1C; Future; Expected date: 05/06/2024  -     CBC With Differential With Platelet; Future; Expected date: 05/06/2024  -     Comp Metabolic Panel (14); Future; Expected date: 05/06/2024  -     TSH W Reflex To Free T4; Future; Expected date: 05/06/2024  2. Hypothyroidism affecting pregnancy, antepartum (HCC)  Overview:  6/9: referral to rachel  07/11- repeat labs WNL  8/14 - TSH abnormal. Saw rachel, started on meds  10/9- tsh 2.6  MFM CONSULT:  Check TFT's q  trimester with appropriate levothyroxine dose adjustment.  Follow-up growth and BPP ultrasound at 32 weeks- done  Orders:  -     TSH W Reflex To Free T4; Future; Expected date: 05/06/2024  3. Ingrown toenail of right foot  Assessment & Plan:  Referral to pods for removal    Orders:  -     Cancel: Podiatry Referral - In Network  -     Podiatry Referral - In Network  4. Tinea pedis of right foot  Assessment & Plan:  Clean dry socks.  Clotrimazole bid x 14 days.  Follow up as needed.  Orders:  -     Clotrimazole; Apply 1 Application topically 2 (two) times daily for 14 days.  Dispense: 60 g; Refill: 1  5. Anxiety  Overview:  6/8/23 Stable  Assessment & Plan:  Stable         Return if symptoms worsen or fail to improve.    NATE Severino, 5/6/2024, 7:57 AM

## 2024-05-06 NOTE — ASSESSMENT & PLAN NOTE
Wellness labs ordered.  Encouraged increasing physical activity which includes raising heart rate 3 to 5 days/week for at least 30 minutes at a time, while also performing mild strength exercises.  Patient counseled on importance of dietary modifications, which may include limiting fats, red meat, and carbohydrates, while increasing fruit and vegetable intake, and trying to adhere to a low sodium/Mediterranean diet.  Encouraged to manage stress.  Encouraged good sleep habits.  Encouraged good sexual health.    Patient aware of plan of care. All questions answered to satisfaction of the patient. Patient instructed to call office or reach out via [facility] if any issues arise. For urgent issues and/or reviewed red flags please proceed to the urgent care or ER.  Also, inform the nurse practitioner with any new symptoms or medication side effects.

## 2024-12-23 ENCOUNTER — OFFICE VISIT (OUTPATIENT)
Dept: FAMILY MEDICINE CLINIC | Facility: CLINIC | Age: 29
End: 2024-12-23

## 2024-12-23 VITALS
HEART RATE: 103 BPM | WEIGHT: 126.38 LBS | OXYGEN SATURATION: 99 % | BODY MASS INDEX: 24.81 KG/M2 | SYSTOLIC BLOOD PRESSURE: 121 MMHG | HEIGHT: 60 IN | DIASTOLIC BLOOD PRESSURE: 73 MMHG

## 2024-12-23 DIAGNOSIS — M54.41 RIGHT-SIDED LOW BACK PAIN WITH RIGHT-SIDED SCIATICA, UNSPECIFIED CHRONICITY: Primary | ICD-10-CM

## 2024-12-23 PROCEDURE — 99213 OFFICE O/P EST LOW 20 MIN: CPT

## 2024-12-23 RX ORDER — METHYLPREDNISOLONE 4 MG/1
TABLET ORAL
Qty: 21 EACH | Refills: 0 | Status: SHIPPED | OUTPATIENT
Start: 2024-12-23

## 2024-12-23 RX ORDER — NAPROXEN 250 MG/1
250 TABLET ORAL 2 TIMES DAILY PRN
Qty: 30 TABLET | Refills: 0 | Status: SHIPPED | OUTPATIENT
Start: 2024-12-23

## 2024-12-23 RX ORDER — CYCLOBENZAPRINE HCL 5 MG
5 TABLET ORAL NIGHTLY PRN
Qty: 30 TABLET | Refills: 0 | Status: SHIPPED | OUTPATIENT
Start: 2024-12-23

## 2024-12-23 NOTE — PROGRESS NOTES
For someone gave her referral to therapy  Subjective:   Eric Blount is a 29 year old female who presents for Hip Pain (Right hip & leg, past over a year, haven't been able to go to physical therapy but referral , she stretches, use heat pad, tingly sensation on right leg, been more painful now, felt dizzy today)   Patient is a pleasant 29-year-old female past medical history consistent for anxiety, fibroids, postpartum anemia, and hypothyroidism.  Patient presents to office today for pain concern. Patient states since she was pregnant with her second child she has suffered from back pain.  She gained 40 pounds from her last pregnancy.  She had pain in her right sacroiliac area that radiated down her leg from time to time.  She was provided with a referral to physical therapy from midwife was never able to schedule.  Pain has been intermittent since then.  For the last week patient has noticed she is having more pain again in the right sacroiliac area that radiates down her right leg with tingling sensation.  Denies numbness.  Denies weakness.  Denies bowel and bladder involvement.  Patient has not taken anything besides vitamins for pain.        Past Medical History:    Anemia    PP    Anxiety    in couseling, new diagnosis    Decorative tattoo    Fibroids    History of chicken pox    at age 3    History of hypothyroidism    In 1st pregnancy- TSH with NOB labs    Hypothyroidism    Postpartum anemia (HCC)    Postpartum depression    Tuberculosis contact    mother positive 2020      History reviewed. No pertinent surgical history.     History/Other:    Chief Complaint Reviewed and Verified  Nursing Notes Reviewed and   Verified  Tobacco Reviewed  Allergies Reviewed  Medications Reviewed    Problem List Reviewed  Medical History Reviewed  Surgical History   Reviewed  OB Status Reviewed  Family History Reviewed  Social History   Reviewed         Tobacco:  She has never smoked  tobacco.    Current Outpatient Medications   Medication Sig Dispense Refill    naproxen 250 MG Oral Tab Take 1 tablet (250 mg total) by mouth 2 (two) times daily as needed (back pain). 30 tablet 0    methylPREDNISolone (MEDROL) 4 MG Oral Tablet Therapy Pack As directed. 21 each 0    cyclobenzaprine 5 MG Oral Tab Take 1 tablet (5 mg total) by mouth nightly as needed. 30 tablet 0    ibuprofen 600 MG Oral Tab Take 1 tablet (600 mg total) by mouth every 6 (six) hours as needed for Pain. 30 tablet 0    Omega-3 Fatty Acids (FISH OIL OR) Take by mouth.      levothyroxine 25 MCG Oral Tab Take 1 tablet (25 mcg total) by mouth before breakfast. (Patient not taking: Reported on 12/23/2024) 90 tablet 0    Levonorgestrel-Ethinyl Estrad (LUTERA) 0.1-20 MG-MCG Oral Tab Take 1 tablet by mouth daily. (Patient not taking: Reported on 12/23/2024) 28 tablet 11    Ferrous Gluconate 324 (38 Fe) MG Oral Tab Take 1 tablet (325 mg total) by mouth daily with breakfast. (Patient not taking: Reported on 12/23/2024)      prenatal vitamin with DHA 27-0.8-228 MG Oral Cap Take 1 capsule by mouth daily. (Patient not taking: Reported on 12/23/2024)      COLLAGEN OR Take by mouth. (Patient not taking: Reported on 12/23/2024)           Review of Systems:  Review of Systems   Constitutional:  Negative for chills and fever.   Respiratory:  Negative for cough and shortness of breath.    Cardiovascular:  Negative for chest pain.   Musculoskeletal:  Positive for arthralgias, back pain and myalgias.   All other systems reviewed and are negative.        Objective:   /73 (BP Location: Left arm, Patient Position: Sitting, Cuff Size: adult)   Pulse 103   Ht 5' (1.524 m)   Wt 126 lb 6.4 oz (57.3 kg)   LMP 11/27/2024 (Exact Date)   SpO2 99%   BMI 24.69 kg/m²  Estimated body mass index is 24.69 kg/m² as calculated from the following:    Height as of this encounter: 5' (1.524 m).    Weight as of this encounter: 126 lb 6.4 oz (57.3 kg).  Physical  Exam  Vitals and nursing note reviewed.   Constitutional:       Appearance: Normal appearance. She is normal weight.   HENT:      Head: Normocephalic and atraumatic.      Right Ear: External ear normal.      Left Ear: External ear normal.   Cardiovascular:      Rate and Rhythm: Normal rate and regular rhythm.      Pulses: Normal pulses.      Heart sounds: Normal heart sounds. No murmur heard.  Pulmonary:      Effort: Pulmonary effort is normal.      Breath sounds: Normal breath sounds.   Musculoskeletal:         General: Tenderness present. No swelling.      Comments: Tender right sacroiliac  Positive straight leg raise right  Weak dorsiflexion great toe right side   Skin:     General: Skin is warm and dry.      Capillary Refill: Capillary refill takes less than 2 seconds.   Neurological:      Mental Status: She is alert and oriented to person, place, and time.           Assessment & Plan:   1. Right-sided low back pain with right-sided sciatica, unspecified chronicity (Primary)  -     Naproxen; Take 1 tablet (250 mg total) by mouth 2 (two) times daily as needed (back pain).  Dispense: 30 tablet; Refill: 0  -     methylPREDNISolone; As directed.  Dispense: 21 each; Refill: 0  -     Cyclobenzaprine HCl; Take 1 tablet (5 mg total) by mouth nightly as needed.  Dispense: 30 tablet; Refill: 0  -     Physical Therapy Referral - [facility]    Patient exam is consistent for positive straight leg raise on right side.  Patient exam is consistent for weak dorsiflexion great toe on right side.  Patient denies bowel and bladder issues  Patient denies weakness to lower extremity.  Patient reports numbness and tingling to lower extremity  Start cyclobenzaprine 5 mg nightly as needed  Start Medrol Dosepak  Start naproxen twice daily as needed for pain  If symptoms persist will obtain imaging and refer to physical therapy and physiatry.    Patient aware of plan of care. All questions answered to satisfaction of the patient.  Patient instructed to call office or reach out via [facility] if any issues arise. For urgent issues and/or reviewed red flags please proceed to the urgent care or ER.  Also, inform the nurse practitioner with any new symptoms or medication side effects.        Return in about 2 weeks (around 1/6/2025).    Endy Pat, NATE, 12/23/2024, 7:37 AM

## 2025-01-08 ENCOUNTER — OFFICE VISIT (OUTPATIENT)
Dept: PHYSICAL THERAPY | Age: 30
End: 2025-01-08
Payer: MEDICAID

## 2025-01-08 DIAGNOSIS — M54.41 RIGHT-SIDED LOW BACK PAIN WITH RIGHT-SIDED SCIATICA, UNSPECIFIED CHRONICITY: Primary | ICD-10-CM

## 2025-01-08 PROCEDURE — 97161 PT EVAL LOW COMPLEX 20 MIN: CPT | Performed by: PHYSICAL THERAPIST

## 2025-01-08 PROCEDURE — 97110 THERAPEUTIC EXERCISES: CPT | Performed by: PHYSICAL THERAPIST

## 2025-01-08 NOTE — PROGRESS NOTES
SPINE EVALUATION:     Diagnosis:   Right-sided low back pain with right-sided sciatica, unspecified chronicity (M54.41) Patient:  Eric Blount (29 year old, female)        Referring Provider: Endy Pat  Today's Date   1/8/2025    Precautions:  None   Date of Evaluation: 01/08/25  Next MD visit: No data recorded  Date of Surgery: No data recorded     PATIENT SUMMARY   Summary of chief complaints: R-sided low back pain radiating to R LE  History of current condition: reports onset of back pain shortklky after child birth 11/26/23   Pain level: current 6 /10, at best 4 /10, at worst 8 /10  Description of symptoms: R-sided back radiating symptoms to R LE   Occupation: NA   Leisure activities/Hobbies: childcare, walking activities   Prior level of function: full mobility and normal tolerance to activities prior to onset of symptoms  Current limitations: decreased trunk mobility, needs to constantly change positions. Limited tolerance to sitting and standing  Pt goals: relief from pain and be able to move better  Past medical history was reviewed with Eric.  Significant findings include: no sig pmhx  Imaging/Tests: DOREEN Reyes  has a past medical history of Anemia, Anxiety, Decorative tattoo, Fibroids, History of chicken pox, History of hypothyroidism, Hypothyroidism, Postpartum anemia (HCC) (03/05/2021), Postpartum depression (04/02/2021), and Tuberculosis contact.  She  has no past surgical history on file.    ASSESSMENT  Eric presents to physical therapy evaluation with primary c/o R-sided low back pain radiating to R LE. The results of the objective tests and measures show limitations in trunk and R LE mobility due to R-sided back pain with R LE radicular symptoms. Functional deficits include but are not limited to decreased trunk mobility, needs to constantly change positions. Limited tolerance to sitting and standing. Signs and symptoms are consistent with diagnosis of Right-sided low back pain  with right-sided sciatica, unspecified chronicity (M54.41). Pt and PT discussed evaluation findings, pathology, POC and HEP.  Pt voiced understanding and performs HEP correctly without reported pain. Skilled Physical Therapy is medically necessary to address the above impairments and reach functional goals.    OBJECTIVE:   Musculoskeletal:  Observation/Posture: Alert and oriented x 3. Some slouching during relaxed sitting but corrects self easily when cued   Accessory Motion:     Palpation: (-) tenderness     Special tests:         TANGELA ROM WNL and Strength (5/5) except below:  (* denotes performed with pain)  Trunk ROM is minimally limited into all planes. R LE symptoms and back pain decreased with repeated extension. No change with repeated motion into other directions.  B LE ROM is WFL into all planes    Flexibility:  LE Flexibility R L     Hip Flexor (+) tightness (+) tightness     Hamstrings (+) tightness (+) tigthness     ITB         Piriformis         Quads         Gastroc-soleus            Neurological:  Sensation: Grossly intact to light touch TANGELA UE/LE except WNL throughout LE's   Deep Tendon Reflexes: WNL except     UMN: signs and symptoms WNL except     Peripheral Neurodynamic: WNL except       Balance and Functional Mobility:  Gait: pt ambulates on level ground with normal mechanics.  Balance: SLS: EO R  , EO L           Today's Treatment and Response:   Pt education was provided on exam findings, treatment diagnosis, treatment plan, expectations, and prognosis.  Today's Treatment       1/8/2025   Treatment   Therapeutic Exer 1 prone press ups 10 x 2   Therapeutic Exer 2 supine R LE dural stretch   Therapeutic Exer 3 R side glides 10x   Therapeutic Exer 4 seated R LE dural stretch   Therapeutic Exer 5 Instructed on HEP. Handouts were created and issued to patient.         1/8/2025   Times   Therapeutic Exercise 25 minutes   Evaluation 20 minutes   Total Time of Timed Procedures 25 minutes   Total Time of  Service-based Procedures 20 minutes   Total Treatment Time 45 minutes        Patient was instructed in and issued a HEP    Charges:  PT EVAL Low Complexity,     In agreement with evaluation findings and clinical rationale, this evaluation involved LOW COMPLEXITY decision making due to no personal factors/comorbidities, 1-2 body structures involved/activity limitations, and stable symptoms as documented in the evaluation.                                                                     PLAN OF CARE:    Goals: (to be met in 8 visits)   Goals       Therapy Goals     1. Patient will be independent with HEP, its progression, and management of residual symptoms.  2. Patient will report back pain of not more than 1/10 during activity.  3. Patient will verbalize and demonstrate strategies to improve posture during activity.      Therapy Goals     Patient will be independent with HEP, its progression, and management of residual symptoms  Patient will report of abolished LE symptoms and back pain of not more than 1/10 during activity.  Increase trunk ROM and LE flexibility to improve mobility.  Increase strength to 5/5 throughout to be able to resume previous activities without difficulty  Patient will verbalize and demonstrate strategies to improve posture and during activity.               Frequency / Duration: Patient will be seen 1-2x/week or a total of 8  visits over a 90 day period. Treatment will include: Manual Therapy; Neuromuscular Re-education; Therapeutic Exercise; Therapeutic Activities; Home Exercise Program instruction; Other (use comment)    Education or treatment limitation: None   Rehab Potential:       Oswestry Disability Index Score  Score: (Patient-Rptd) 14 % (1/8/2025  4:11 PM)      Patient/Family/Caregiver was advised of these findings, precautions, and treatment options and has agreed to actively participate in planning and for this course of care.    Thank you for your referral. Please co-sign or  sign and return this letter via fax as soon as possible to [phone]. If you have any questions, please contact me at Dept: [phone]    Sincerely,  Electronically signed by therapist: Jeremy Duenas Jr., PT    Certification From: 1/8/2025  To:4/8/2025

## 2025-01-13 ENCOUNTER — OFFICE VISIT (OUTPATIENT)
Dept: PHYSICAL THERAPY | Age: 30
End: 2025-01-13
Payer: MEDICAID

## 2025-01-13 PROCEDURE — 97110 THERAPEUTIC EXERCISES: CPT | Performed by: PHYSICAL THERAPIST

## 2025-01-13 PROCEDURE — 97112 NEUROMUSCULAR REEDUCATION: CPT | Performed by: PHYSICAL THERAPIST

## 2025-01-15 ENCOUNTER — OFFICE VISIT (OUTPATIENT)
Dept: PHYSICAL THERAPY | Age: 30
End: 2025-01-15
Payer: MEDICAID

## 2025-01-15 PROCEDURE — 97110 THERAPEUTIC EXERCISES: CPT | Performed by: PHYSICAL THERAPIST

## 2025-01-15 PROCEDURE — 97112 NEUROMUSCULAR REEDUCATION: CPT | Performed by: PHYSICAL THERAPIST

## 2025-01-15 NOTE — PROGRESS NOTES
Patient: Eric Blount (29 year old, female) Referring Provider:  Insurance:   Diagnosis: Right-sided low back pain with right-sided sciatica, unspecified chronicity (M54.41) Endy Pat  [facility] MEDICAID   Date of Surgery: No data recorded Next MD visit:  N/A   Precautions:  None No data recorded Referral Information:    Date of Evaluation: Req: 11, Auth: 11, Exp: 3/9/2025    01/08/25 POC Auth Visits:  11       Today's Date   1/15/2025    Subjective  Patient complains of hip pain and some back stiffness       Pain: 4/10     Objective  Trunk ROM is minimally limited into all planes. R LE symptoms and back pain decreased with repeated extension. No change with repeated motion into other directions.  B LE ROM is WFL into all planes     Assessment  Demonstrates improved trunk mobility and tolerance to core strengthening. Still with some back and R hip pain. Patient and PT goals are in progress.    Goals (to be met in 11 visits)   Goals       Therapy Goals     1. Patient will be independent with HEP, its progression, and management of residual symptoms.  2. Patient will report back pain of not more than 1/10 during activity.  3. Patient will verbalize and demonstrate strategies to improve posture during activity.      Therapy Goals     Patient will be independent with HEP, its progression, and management of residual symptoms  Patient will report of abolished LE symptoms and back pain of not more than 1/10 during activity.  Increase trunk ROM and LE flexibility to improve mobility.  Increase strength to 5/5 throughout to be able to resume previous activities without difficulty  Patient will verbalize and demonstrate strategies to improve posture and during activity.              Plan  Continue PT to improve mobility.    Treatment Last 4 Visits       1/8/2025 1/13/2025 1/15/2025   Treatment   Treatment Day  2 3   Therapeutic Exer 1 prone press ups 10 x 2     Therapeutic Exer 2 supine R LE dural stretch      Therapeutic Exer 3 R side glides 10x     Therapeutic Exer 4 seated R LE dural stretch     Therapeutic Exer 5 Instructed on HEP. Handouts were created and issued to patient.     Therapeutic Exercise  X25min  - prone press ups 10 x 2  - prone LE extensions 20x  - supine single knee to chest 10 x 5 sec hold  - supine hamstring stretch 10 x 10 sec hold  - neuro re-ed in supine  - Shuttle B LE leg press 6 bands 10 x 3  - Shuttle single LE leg press 4 bands 10 x 2  - repeat standing trunk extensions 10 x 2  - neuro re-ed   - repeat standing trunk extensions 10x  - Nustep L5 x 10min X25min  - prone press ups 10 x 2  - prone LE extensions 20x  - supine single knee to chest 10 x 5 sec hold  - supine hamstring stretch 10 x 10 sec hold  - neuro re-ed in supine  - Shuttle B LE leg press 6 bands 10 x 3  - Shuttle single LE leg press 4 bands 10 x 2  - repeat standing trunk extensions 10 x 2  - neuro re-ed   - repeat standing trunk extensions 10x  - Nustep L5 x 10min   Neuro Re-Ed  X23min   - bridging 20 x 5 sec hoild  - hip and knee flexion on swiss ball 10 x 2  - alternate LE lifts on swiss ball 10 x 2  - modified deadbugs with 3lb db's 10 x 2  - Multi-pull shoulder rows 15# 10 x 2  - Multi-pull hi-lo rotation 10# 10 x 2  - alternate forward lunges on BOSU 10 x 2  - side lunges on BOSU 10x  2 X25min   - bridging on swiss ball 20 x 5 sec hoild  - hip and knee flexion on swiss ball 10 x 2  - alternate LE lifts on swiss ball 10 x 2  - modified deadbugs with 3lb db's 10 x 2  - Multi-pull shoulder rows 15# 10 x 2  - Multi-pull hi-lo rotation 10# 10 x 2  - alternate forward lunges on BOSU with 2lb db's 10 x 2  - side lunges on BOSU with 2lb db's 10 x 2   Therapeutic Exercise Min  25    Neuro Re-Ed Min  23    Total of Timed Procedures  48 0   Total of Service Based  0 0   Total Treatment Time  48 0         1/8/2025   Times   Therapeutic Exercise 25 minutes   Evaluation 20 minutes   Total Time of Timed Procedures 25 minutes   Total  Time of Service-based Procedures 20 minutes   Total Treatment Time 45 minutes        HEP       Charges     EX2, Neuro re-ed2

## 2025-01-20 ENCOUNTER — APPOINTMENT (OUTPATIENT)
Dept: PHYSICAL THERAPY | Age: 30
End: 2025-01-20
Payer: MEDICAID

## 2025-01-20 ENCOUNTER — TELEPHONE (OUTPATIENT)
Dept: PHYSICAL THERAPY | Facility: HOSPITAL | Age: 30
End: 2025-01-20

## 2025-01-23 ENCOUNTER — TELEPHONE (OUTPATIENT)
Dept: PHYSICAL THERAPY | Facility: HOSPITAL | Age: 30
End: 2025-01-23

## 2025-01-23 ENCOUNTER — APPOINTMENT (OUTPATIENT)
Dept: PHYSICAL THERAPY | Age: 30
End: 2025-01-23
Attending: PHYSICAL THERAPIST
Payer: MEDICAID

## 2025-01-27 ENCOUNTER — OFFICE VISIT (OUTPATIENT)
Dept: PHYSICAL THERAPY | Age: 30
End: 2025-01-27
Payer: MEDICAID

## 2025-01-27 PROCEDURE — 97110 THERAPEUTIC EXERCISES: CPT | Performed by: PHYSICAL THERAPIST

## 2025-01-27 PROCEDURE — 97112 NEUROMUSCULAR REEDUCATION: CPT | Performed by: PHYSICAL THERAPIST

## 2025-01-27 NOTE — PROGRESS NOTES
Patient: Eric Blount (29 year old, female) Referring Provider:  Insurance:   Diagnosis: Right-sided low back pain with right-sided sciatica, unspecified chronicity (M54.41) Endy Pat  [facility] MEDICAID   Date of Surgery: No data recorded Next MD visit:  N/A   Precautions:  None No data recorded Referral Information:    Date of Evaluation: Req: 11, Auth: 11, Exp: 3/9/2025    01/08/25 POC Auth Visits:  11       Today's Date   1/27/2025    Subjective  Patient states her back is feeling better. Reports being painfree.       Pain: 0/10     Objective  Trunk ROM is minimally limited into all planes. R LE symptoms and back pain decreased with repeated extension. No change with repeated motion into other directions.  B LE ROM is WFL into all planes     Assessment  Demonstrates improved trunk mobility and tolerance to core strengthening. Still with some back and R hip pain. Patient and PT goals are in progress.    Goals (to be met in 11 visits)   Goals       Therapy Goals     1. Patient will be independent with HEP, its progression, and management of residual symptoms.  2. Patient will report back pain of not more than 1/10 during activity.  3. Patient will verbalize and demonstrate strategies to improve posture during activity.      Therapy Goals     Patient will be independent with HEP, its progression, and management of residual symptoms  Patient will report of abolished LE symptoms and back pain of not more than 1/10 during activity.  Increase trunk ROM and LE flexibility to improve mobility.  Increase strength to 5/5 throughout to be able to resume previous activities without difficulty  Patient will verbalize and demonstrate strategies to improve posture and during activity.              Plan  Continue PT to improve mobility.       1/27/2025   1/15/2025 1/13/2025   Treatment Day 4/11 3/11 2/11   Thera Ex   X25min  - standing trunk extensions 10x  - single knee to chest 10x  - supine hamstring stretch 5  x 10 sec hold  - neuro re-ed  - Shuttle B LE leg press 6 bands 10 x 3  - Shuttle single LE leg press 4 bands 10x  2  - repeat standing trunk extensions  - forward lunges with 3lb db's 10x  - step back lungs with 3lb db's 10x  - Nustep L5 x10min   X25min  - prone press ups 10 x 2  - prone LE extensions 20x  - supine single knee to chest 10 x 5 sec hold  - supine hamstring stretch 10 x 10 sec hold  - neuro re-ed in supine  - Shuttle B LE leg press 6 bands 10 x 3  - Shuttle single LE leg press 4 bands 10 x 2  - repeat standing trunk extensions 10 x 2  - neuro re-ed   - repeat standing trunk extensions 10x  - Nustep L5 x 10min   X25min  - prone press ups 10 x 2  - prone LE extensions 20x  - supine single knee to chest 10 x 5 sec hold  - supine hamstring stretch 10 x 10 sec hold  - neuro re-ed in supine  - Shuttle B LE leg press 6 bands 10 x 3  - Shuttle single LE leg press 4 bands 10 x 2  - repeat standing trunk extensions 10 x 2  - neuro re-ed   - repeat standing trunk extensions 10x  - Nustep L5 x 10min   Neuromuscular Re-education   X25min  - hip and knee flexion on swiss ball 10 x 2  - bridging on swiss ball 20 x 5 sec hold  - modified deadbugs with 3lb db's 10 x 2  - Multi-pull shoulder rows 15# 10 x 2  - Multi-pull hi-lo rotation 10# 10 x 2  - alternate forward lunges on BOSU with 2lb db's 10 x 2  - side lunges on BOSU with 2lb db's 10 x 2   X25min   - bridging on swiss ball 20 x 5 sec hoild  - hip and knee flexion on swiss ball 10 x 2  - alternate LE lifts on swiss ball 10 x 2  - modified deadbugs with 3lb db's 10 x 2  - Multi-pull shoulder rows 15# 10 x 2  - Multi-pull hi-lo rotation 10# 10 x 2  - alternate forward lunges on BOSU with 2lb db's 10 x 2  - side lunges on BOSU with 2lb db's 10 x 2   X23min   - bridging 20 x 5 sec hoild  - hip and knee flexion on swiss ball 10 x 2  - alternate LE lifts on swiss ball 10 x 2  - modified deadbugs with 3lb db's 10 x 2  - Multi-pull shoulder rows 15# 10 x 2  - Multi-pull  hi-lo rotation 10# 10 x 2  - alternate forward lunges on BOSU 10 x 2  - side lunges on BOSU 10x  2   Manual PT        Modalities                        (Time in minutes)      Therapeutic Exercises   25min 25min 25min   Neuro Re-education   25min 25min 23min   Manual PT        Therapeutic Activity        Gait Training        Total of Timed Procedures 50min 50min 48min   Total of Service-Based Procedures      Total Treatment Time 50min   50min 48min       Charges     EX2, Neuro re-ed2

## 2025-02-04 ENCOUNTER — APPOINTMENT (OUTPATIENT)
Dept: PHYSICAL THERAPY | Age: 30
End: 2025-02-04
Attending: PHYSICAL THERAPIST
Payer: MEDICAID

## 2025-02-04 ENCOUNTER — TELEPHONE (OUTPATIENT)
Dept: PHYSICAL THERAPY | Age: 30
End: 2025-02-04

## 2025-02-06 ENCOUNTER — APPOINTMENT (OUTPATIENT)
Dept: PHYSICAL THERAPY | Age: 30
End: 2025-02-06
Attending: PHYSICAL THERAPIST
Payer: MEDICAID

## 2025-02-27 ENCOUNTER — OFFICE VISIT (OUTPATIENT)
Dept: PHYSICAL THERAPY | Age: 30
End: 2025-02-27
Payer: MEDICAID

## 2025-02-27 PROCEDURE — 97110 THERAPEUTIC EXERCISES: CPT

## 2025-02-27 PROCEDURE — 97112 NEUROMUSCULAR REEDUCATION: CPT

## 2025-02-27 NOTE — PROGRESS NOTES
Patient: Eric Blount (29 year old, female) Referring Provider:  Insurance:   Diagnosis: Right-sided low back pain with right-sided sciatica, unspecified chronicity (M54.41) Endy Pat  [facility] MEDICAID   Date of Surgery: No data recorded Next MD visit:  N/A   Precautions:  None No data recorded Referral Information:    Date of Evaluation: Req: 11, Auth: 11, Exp: 3/9/2025    01/08/25 POC Auth Visits:  11       Today's Date   2/27/2025    Subjective  Pt states that she still has pain at 4/10 at worst on the low back,  she has been able to do her HEP. She has been able to stairs master and TM , easy but always felt like she is weak on her core       Pain: 0/10     Objective        PFM extenal contraction: marked 3 secs  Bear down: marked   Transverse Abdominis: 3/5      Diastasis Recti: (finger width depth while contracted)  Above Umbilicus: 0  Umbilicus: 1  Below Umbilicus: 0      Assessment      Pt was started on PFM contractions initilly x3 sesc x10 x 3x a day with qucik contractions and when to stap  TRa remephasiez with stabilization ex given with hip adduction and SLR towards flexion, abd and clams  Discussed precontractions with sit to stand activities /coughing    Leaking  improved by:   Stress incontinence:  Yes   Amount of leakage: small leak , when sneezes  Pad use: panty liners  Pad Change frequency: 3x    Urinary urges : NA  Urinary frequency normal frequency  Trouble emptying bladder: completely :Yes   Nocturia: No    Fluid Intake: water intake,   BOWEL HABITS  Types of symptoms:  trouble emptyig  Frequency of bowel movements: varies could be a day or 2 without  Stool consistency: [place] Stool Scale: 4  Do you strain with defecation: No   Laxative/Stool softener use: No    SEXUAL HEALTH STATUS    Fear of having sex and no pain, some discomfort          Goals (to be met in 11 visits)   Goals       Therapy Goals     1. Patient will be independent with HEP, its progression, and management  of residual symptoms.  2. Patient will report back pain of not more than 1/10 during activity.  3. Patient will verbalize and demonstrate strategies to improve posture during activity.      Therapy Goals     Patient will be independent with HEP, its progression, and management of residual symptoms  Patient will report of abolished LE symptoms and back pain of not more than 1/10 during activity.  Increase trunk ROM and LE flexibility to improve mobility.  Increase strength to 5/5 throughout to be able to resume previous activities without difficulty  Patient will verbalize and demonstrate strategies to improve posture and during activity.              Plan : assess effects of PFM contraction           2/27/25 1/27/2025   1/15/2025 1/13/2025   Treatment Day 5/11 4/11 3/11 2/11   Thera Ex   X23 mins  Assessment of CHARLIE/PFM externally    TRa  with hip adduction with ball SLR 10 secs x10  SL hip abd x10  SL clams x10   X25min  - standing trunk extensions 10x  - single knee to chest 10x  - supine hamstring stretch 5 x 10 sec hold  - neuro re-ed  - Shuttle B LE leg press 6 bands 10 x 3  - Shuttle single LE leg press 4 bands 10x  2  - repeat standing trunk extensions  - forward lunges with 3lb db's 10x  - step back lungs with 3lb db's 10x  - Nustep L5 x10min   X25min  - prone press ups 10 x 2  - prone LE extensions 20x  - supine single knee to chest 10 x 5 sec hold  - supine hamstring stretch 10 x 10 sec hold  - neuro re-ed in supine  - Shuttle B LE leg press 6 bands 10 x 3  - Shuttle single LE leg press 4 bands 10 x 2  - repeat standing trunk extensions 10 x 2  - neuro re-ed   - repeat standing trunk extensions 10x  - Nustep L5 x 10min   X25min  - prone press ups 10 x 2  - prone LE extensions 20x  - supine single knee to chest 10 x 5 sec hold  - supine hamstring stretch 10 x 10 sec hold  - neuro re-ed in supine  - Shuttle B LE leg press 6 bands 10 x 3  - Shuttle single LE leg press 4 bands 10 x 2  - repeat standing trunk  extensions 10 x 2  - neuro re-ed   - repeat standing trunk extensions 10x  - Nustep L5 x 10min   Neuromuscular Re-education   X23 mins    DB in supine and seated and standing  TrA facilitation in supine /SL and standing      PFM long hold x 3 secs x 2  reps : supine , seated  with towel and SL    PFM quick contraction x 1- 2secs hold x10; supine  seated     TrA facilitation 5 secs x 3 reps    TrA with PF x 3  reps     PRECONTRACTIONS for BIOMECHANICAL STRATEGIES     precontractions of PFM with cough x 2; standing     sit to stand x 5 reps     X25min  - hip and knee flexion on swiss ball 10 x 2  - bridging on swiss ball 20 x 5 sec hold  - modified deadbugs with 3lb db's 10 x 2  - Multi-pull shoulder rows 15# 10 x 2  - Multi-pull hi-lo rotation 10# 10 x 2  - alternate forward lunges on BOSU with 2lb db's 10 x 2  - side lunges on BOSU with 2lb db's 10 x 2   X25min   - bridging on swiss ball 20 x 5 sec hoild  - hip and knee flexion on swiss ball 10 x 2  - alternate LE lifts on swiss ball 10 x 2  - modified deadbugs with 3lb db's 10 x 2  - Multi-pull shoulder rows 15# 10 x 2  - Multi-pull hi-lo rotation 10# 10 x 2  - alternate forward lunges on BOSU with 2lb db's 10 x 2  - side lunges on BOSU with 2lb db's 10 x 2   X23min   - bridging 20 x 5 sec hoild  - hip and knee flexion on swiss ball 10 x 2  - alternate LE lifts on swiss ball 10 x 2  - modified deadbugs with 3lb db's 10 x 2  - Multi-pull shoulder rows 15# 10 x 2  - Multi-pull hi-lo rotation 10# 10 x 2  - alternate forward lunges on BOSU 10 x 2  - side lunges on BOSU 10x  2   Manual PT         Modalities                           (Time in minutes)       Therapeutic Exercises   23 25min 25min 25min   Neuro Re-education   23 25min 25min 23min   Manual PT         Therapeutic Activity   PT provided/reviewed education on pelvic anatomy and function with diagrams and pelvic model, bladder normatives, adequate hydration levels, and coordination of diaphragmatic breathing  and pelvic floor contraction   additional HEP precontractions       Gait Training         Total of Timed Procedures 46 50min 50min 48min   Total of Service-Based Procedures       Total Treatment Time 50 50min   50min 48min       Charges     EX2, Neuro re-ed2

## 2025-03-30 NOTE — PROGRESS NOTES
Subjective:   Eric Blount is a 29 year old female who presents for Chest Pain (past 2 wks, lower left side heart pinch which goes to chest, pt stated she isn't anxious, no meds taken, tried not to take caffeine as much, )   Patient is a pleasant 29-year-old female past medical history consistent for anxiety, fibroids, postpartum anemia, and hypothyroidism.  Patient presents to office today for evaluation of chest pain x 2 weeks. Patient states 2 Wednesdays she had a pain in her left chest by her heart into her axilla. No jaw pain. No arm pain. It does hurt more when she takes a deep breath. It comes and goes. Unable to pinpoint the cause. She has had some gas with this as well. No burping. Denies bloating. No cough. No burning or taste. She has hx of thyroid issues while pregnant. She has been trying to eat healthier and has been having more flax seed, kylie seeds, and leafy greens. Advised likely gas pain. She does report occasional palpiations when she has this as well. Will work up for chest pain.     Modified Heart Score in Office    History 0  -Highly suspicious  2  -Moderately suspicious 1  -Slightly suspicious  0    ECG 0  -Ordered in office    Age 0  -Greater than 65  2  -45 to 65 years old  1  -Less than 45 years old 0    Risk factors 0  -Greater than 3 risk factors 2  -1 of 2 risk factors  1  -No risk factors known 0    Troponin 0  -Ordered in office    Risk factors for atherosclerotic disease  Hyperlipidemia, hypertension, diabetes mellitus, cigarette smoking, positive family history, obesity or BMI greater than 30    0-3 low risk  4-6 moderate risk  > 6 High risk send to ED          Past Medical History:    Anemia    PP    Anxiety    in couseling, new diagnosis    Decorative tattoo    Fibroids    History of chicken pox    at age 3    History of hypothyroidism    In 1st pregnancy- TSH with NOB labs    Hypothyroidism    Postpartum anemia (HCC)    Postpartum depression    Tuberculosis contact     mother positive 2020      History reviewed. No pertinent surgical history.     History/Other:    Chief Complaint Reviewed and Verified  Nursing Notes Reviewed and   Verified  Tobacco Reviewed  Allergies Reviewed  Medications Reviewed    Problem List Reviewed  Medical History Reviewed  Surgical History   Reviewed  OB Status Reviewed  Family History Reviewed  Social History   Reviewed         Tobacco:  She has never smoked tobacco.    No current outpatient medications on file.         Review of Systems:  Review of Systems   Constitutional:  Negative for chills and fever.   Respiratory:  Positive for chest tightness. Negative for cough and shortness of breath.    Cardiovascular:  Positive for chest pain and palpitations.   Gastrointestinal:  Positive for abdominal distention and constipation.         Objective:   /74 (BP Location: Left arm, Patient Position: Sitting, Cuff Size: large)   Pulse 87   Ht 5' (1.524 m)   Wt 127 lb (57.6 kg)   LMP 03/20/2025 (Exact Date)   SpO2 99%   BMI 24.80 kg/m²  Estimated body mass index is 24.8 kg/m² as calculated from the following:    Height as of this encounter: 5' (1.524 m).    Weight as of this encounter: 127 lb (57.6 kg).  Physical Exam      Assessment & Plan:   1. Chest pain, unspecified type (Primary)  -     EKG 12 Lead; Future; Expected date: 03/31/2025  -     XR CHEST PA + LAT CHEST (CPT=71046); Future; Expected date: 03/31/2025  -     CBC With Differential With Platelet; Future; Expected date: 03/31/2025  -     Comp Metabolic Panel (14); Future; Expected date: 03/31/2025  -     TSH W Reflex To Free T4; Future; Expected date: 03/31/2025  -     D-Dimer; Future; Expected date: 03/31/2025  -     Troponin I (High Sensitivity); Future; Expected date: 03/31/2025  -     Vitamin D; Future; Expected date: 03/31/2025  -     Magnesium; Future; Expected date: 03/31/2025  -     Vitamin B12; Future; Expected date: 03/31/2025  2. LUQ pain  -     Vitamin D; Future;  Expected date: 03/31/2025  -     Magnesium; Future; Expected date: 03/31/2025  -     Vitamin B12; Future; Expected date: 03/31/2025  3. Gas pain  -     Vitamin D; Future; Expected date: 03/31/2025  -     Magnesium; Future; Expected date: 03/31/2025  -     Vitamin B12; Future; Expected date: 03/31/2025  4. Palpitations  -     EKG 12 Lead; Future; Expected date: 03/31/2025  -     XR CHEST PA + LAT CHEST (CPT=71046); Future; Expected date: 03/31/2025  -     Vitamin D; Future; Expected date: 03/31/2025  -     Magnesium; Future; Expected date: 03/31/2025  -     Vitamin B12; Future; Expected date: 03/31/2025    Check EKG  Check labs  Check CXR  Advised simethicone over the counter  Await work up  Red flags reviewed  Proceed to ED if worsens    Patient aware of plan of care. All questions answered to satisfaction of the patient. Patient instructed to call office or reach out via [employer] if any issues arise. For urgent issues and/or reviewed red flags please proceed to the urgent care or ER.  Also, inform the nurse practitioner with any new symptoms or medication side effects.          Return if symptoms worsen or fail to improve.    NATE Severino, 3/30/2025, 11:35 AM

## 2025-03-31 ENCOUNTER — EKG ENCOUNTER (OUTPATIENT)
Dept: LAB | Age: 30
End: 2025-03-31
Payer: MEDICAID

## 2025-03-31 ENCOUNTER — OFFICE VISIT (OUTPATIENT)
Dept: FAMILY MEDICINE CLINIC | Facility: CLINIC | Age: 30
End: 2025-03-31

## 2025-03-31 ENCOUNTER — HOSPITAL ENCOUNTER (OUTPATIENT)
Dept: GENERAL RADIOLOGY | Age: 30
Discharge: HOME | End: 2025-03-31
Payer: MEDICAID

## 2025-03-31 ENCOUNTER — LAB ENCOUNTER (OUTPATIENT)
Dept: LAB | Age: 30
End: 2025-03-31
Payer: MEDICAID

## 2025-03-31 VITALS
DIASTOLIC BLOOD PRESSURE: 74 MMHG | OXYGEN SATURATION: 99 % | SYSTOLIC BLOOD PRESSURE: 111 MMHG | WEIGHT: 127 LBS | HEART RATE: 87 BPM | BODY MASS INDEX: 24.94 KG/M2 | HEIGHT: 60 IN

## 2025-03-31 DIAGNOSIS — R00.2 PALPITATIONS: ICD-10-CM

## 2025-03-31 DIAGNOSIS — R10.12 LUQ PAIN: ICD-10-CM

## 2025-03-31 DIAGNOSIS — R14.1 GAS PAIN: ICD-10-CM

## 2025-03-31 DIAGNOSIS — R07.9 CHEST PAIN, UNSPECIFIED TYPE: ICD-10-CM

## 2025-03-31 DIAGNOSIS — D64.9 ANEMIA, UNSPECIFIED TYPE: ICD-10-CM

## 2025-03-31 DIAGNOSIS — R07.9 CHEST PAIN, UNSPECIFIED TYPE: Primary | ICD-10-CM

## 2025-03-31 LAB
ALBUMIN SERPL-MCNC: 4.2 G/DL (ref 3.2–4.8)
ALBUMIN/GLOB SERPL: 1.4 {RATIO} (ref 1–2)
ALP LIVER SERPL-CCNC: 47 U/L
ALT SERPL-CCNC: 8 U/L
ANION GAP SERPL CALC-SCNC: 8 MMOL/L (ref 0–18)
AST SERPL-CCNC: 18 U/L (ref ?–34)
BASOPHILS # BLD AUTO: 0.02 X10(3) UL (ref 0–0.2)
BASOPHILS NFR BLD AUTO: 0.3 %
BILIRUB SERPL-MCNC: 0.4 MG/DL (ref 0.3–1.2)
BUN BLD-MCNC: 16 MG/DL (ref 9–23)
BUN/CREAT SERPL: 18 (ref 10–20)
CALCIUM BLD-MCNC: 9.2 MG/DL (ref 8.7–10.4)
CHLORIDE SERPL-SCNC: 105 MMOL/L (ref 98–112)
CO2 SERPL-SCNC: 27 MMOL/L (ref 21–32)
CREAT BLD-MCNC: 0.89 MG/DL
DEPRECATED RDW RBC AUTO: 42.8 FL (ref 35.1–46.3)
EGFRCR SERPLBLD CKD-EPI 2021: 91 ML/MIN/1.73M2 (ref 60–?)
EOSINOPHIL # BLD AUTO: 0.07 X10(3) UL (ref 0–0.7)
EOSINOPHIL NFR BLD AUTO: 1 %
ERYTHROCYTE [DISTWIDTH] IN BLOOD BY AUTOMATED COUNT: 12.6 % (ref 11–15)
FASTING STATUS PATIENT QL REPORTED: NO
GLOBULIN PLAS-MCNC: 2.9 G/DL (ref 2–3.5)
GLUCOSE BLD-MCNC: 92 MG/DL (ref 70–99)
HCT VFR BLD AUTO: 35.1 %
HGB BLD-MCNC: 11.7 G/DL
IMM GRANULOCYTES # BLD AUTO: 0.01 X10(3) UL (ref 0–1)
IMM GRANULOCYTES NFR BLD: 0.1 %
LYMPHOCYTES # BLD AUTO: 1.76 X10(3) UL (ref 1–4)
LYMPHOCYTES NFR BLD AUTO: 24.9 %
MAGNESIUM SERPL-MCNC: 1.8 MG/DL (ref 1.6–2.6)
MCH RBC QN AUTO: 31 PG (ref 26–34)
MCHC RBC AUTO-ENTMCNC: 33.3 G/DL (ref 31–37)
MCV RBC AUTO: 93.1 FL
MONOCYTES # BLD AUTO: 0.53 X10(3) UL (ref 0.1–1)
MONOCYTES NFR BLD AUTO: 7.5 %
NEUTROPHILS # BLD AUTO: 4.68 X10 (3) UL (ref 1.5–7.7)
NEUTROPHILS # BLD AUTO: 4.68 X10(3) UL (ref 1.5–7.7)
NEUTROPHILS NFR BLD AUTO: 66.2 %
OSMOLALITY SERPL CALC.SUM OF ELEC: 291 MOSM/KG (ref 275–295)
PLATELET # BLD AUTO: 235 10(3)UL (ref 150–450)
POTASSIUM SERPL-SCNC: 4.1 MMOL/L (ref 3.5–5.1)
PROT SERPL-MCNC: 7.1 G/DL (ref 5.7–8.2)
RBC # BLD AUTO: 3.77 X10(6)UL
SODIUM SERPL-SCNC: 140 MMOL/L (ref 136–145)
TROPONIN I SERPL HS-MCNC: <3 NG/L
TSI SER-ACNC: 1.98 UIU/ML (ref 0.55–4.78)
VIT B12 SERPL-MCNC: 644 PG/ML (ref 211–911)
VIT D+METAB SERPL-MCNC: 31.4 NG/ML (ref 30–100)
WBC # BLD AUTO: 7.1 X10(3) UL (ref 4–11)

## 2025-03-31 PROCEDURE — 36415 COLL VENOUS BLD VENIPUNCTURE: CPT

## 2025-03-31 PROCEDURE — 82728 ASSAY OF FERRITIN: CPT

## 2025-03-31 PROCEDURE — 85025 COMPLETE CBC W/AUTO DIFF WBC: CPT

## 2025-03-31 PROCEDURE — 84443 ASSAY THYROID STIM HORMONE: CPT

## 2025-03-31 PROCEDURE — 83735 ASSAY OF MAGNESIUM: CPT

## 2025-03-31 PROCEDURE — 93010 ELECTROCARDIOGRAM REPORT: CPT | Performed by: STUDENT IN AN ORGANIZED HEALTH CARE EDUCATION/TRAINING PROGRAM

## 2025-03-31 PROCEDURE — 82306 VITAMIN D 25 HYDROXY: CPT

## 2025-03-31 PROCEDURE — 80053 COMPREHEN METABOLIC PANEL: CPT

## 2025-03-31 PROCEDURE — 99214 OFFICE O/P EST MOD 30 MIN: CPT

## 2025-03-31 PROCEDURE — 82607 VITAMIN B-12: CPT

## 2025-03-31 PROCEDURE — 84484 ASSAY OF TROPONIN QUANT: CPT

## 2025-03-31 PROCEDURE — 85379 FIBRIN DEGRADATION QUANT: CPT

## 2025-03-31 PROCEDURE — 84466 ASSAY OF TRANSFERRIN: CPT

## 2025-03-31 PROCEDURE — 93005 ELECTROCARDIOGRAM TRACING: CPT

## 2025-03-31 PROCEDURE — 71046 X-RAY EXAM CHEST 2 VIEWS: CPT

## 2025-03-31 PROCEDURE — 83540 ASSAY OF IRON: CPT

## 2025-03-31 NOTE — TELEPHONE ENCOUNTER
LMTCB Tazorac Counseling:  Patient advised that medication is irritating and drying.  Patient may need to apply sparingly and wash off after an hour before eventually leaving it on overnight.  The patient verbalized understanding of the proper use and possible adverse effects of tazorac.  All of the patient's questions and concerns were addressed.

## 2025-04-01 DIAGNOSIS — R79.0 LOW FERRITIN: ICD-10-CM

## 2025-04-01 DIAGNOSIS — D64.9 ANEMIA, UNSPECIFIED TYPE: Primary | ICD-10-CM

## 2025-04-01 LAB
ATRIAL RATE: 78 BPM
D DIMER PPP FEU-MCNC: <0.27 UG/ML FEU (ref ?–0.5)
DEPRECATED HBV CORE AB SER IA-ACNC: 10 NG/ML
IRON SATN MFR SERPL: 33 %
IRON SERPL-MCNC: 99 UG/DL
P AXIS: 57 DEGREES
P-R INTERVAL: 134 MS
Q-T INTERVAL: 358 MS
QRS DURATION: 78 MS
QTC CALCULATION (BEZET): 408 MS
R AXIS: 60 DEGREES
T AXIS: 27 DEGREES
TOTAL IRON BINDING CAPACITY: 302 UG/DL (ref 250–425)
TRANSFERRIN SERPL-MCNC: 224 MG/DL (ref 250–380)
VENTRICULAR RATE: 78 BPM

## 2025-04-01 RX ORDER — FERROUS SULFATE 325(65) MG
325 TABLET ORAL
Qty: 90 TABLET | Refills: 0 | Status: SHIPPED | OUTPATIENT
Start: 2025-04-01

## 2025-04-01 NOTE — PROGRESS NOTES
1. Anemia, unspecified type  Hemoglobin 11.7.  Hematocrit 35.1.  MCV normal, MCHC normal  Ferritin and transferrin low  Start ferrous sulfate once daily x 90 days recheck level 90 days  - Iron And Tibc [E]; Future  - Ferritin [E]; Future  - CBC With Differential With Platelet; Future  - Ferritin [E]; Future    2. Low ferritin  See above  - CBC With Differential With Platelet; Future  - Ferritin [E]; Future    Endy Pat, APRN

## (undated) NOTE — LETTER
7/26/2023              Shahzad Garcia Jose Alejandro        [address]        [place] [address]         To Whom It May Concern,  The above named patient is currently under our care for pregnancy. Pt may have routine dentistry performed. If x-ray is needed the patient must be shielded with a lead apron covering the abdominal area. Local anesthesia is safe if using Lidocaine with or without Epinephrine. Once allergies have been assessed antibiotics such as Penicillin, Cephalosporin, and Clindamycin may be used. Quinolones are never recommended. When narcotics are needed, Tylenol #3 and Vicodin may be used for dental pain. ASA and NSAID's are not recommended during pregnancy. If you have further questions, please contact our office.      Sincerely,    Tanya Hensley CNM  [facility], [facility], [facility] [address]  [phone]

## (undated) NOTE — LETTER
Dear new mom:    We've missed you! The nurses of [facility] have tried to reach you by phone to ask if you had any questions regarding your health or the care of your new little one.     Please feel free to call your doctor with

## (undated) NOTE — LETTER
Dear New Mom,    We hope you are doing well. If, for any reason, you have questions or concerns about your health or your baby’s health, please contact your provider or your pediatrician or family medicine physician regarding your baby.     At [facility], we feel that postpartum support is very important for new families. Please see the enclosed new parent support flyer that lists support programs and resources with both in-person and online options.     Additionally, our Breastfeeding Centers at [facility] and [facility] in [place], offer outpatient visits with our International Board-Certified Lactation   Consultants (IBCLCs) for any breastfeeding concerns or questions you may have.    For issues related to stress, anxiety or depression, we have a Nurturing Mom support group that meets both in-person or online.  There’s also a 24-hour Mom’s Line where you can request a phone call from a clinical therapist for assistance for postpartum depression.    We encourage you to take advantage of these programs and resources as you recover from childbirth and learn to care for your new infant.    Best wishes,    [facility]            [phone]

## (undated) NOTE — LETTER
AUTHORIZATION FOR SURGICAL OPERATION OR OTHER PROCEDURE    1. I hereby authorize Len RAMOS, and [address] [place] [address] staff assigned to my case to perform the following operation and/or procedure at the [address] [place] [address]:    Gilda Banegas REMOVAL____________________________________________________________      _______________________________________________________________________________________________    2. My physician has explained the nature and purpose of the operation or other procedure, possible alternative methods of treatment, the risks involved, and the possibility of complication to me. I acknowledge that no guarantee has been made as to the result that may be obtained. 3.  I recognize that, during the course of this operation, or other procedure, unforseen conditions may necessitate additional or different procedure than those listed above. I, therefore, further authorize and request that the above named physician, his/her physician assistants or designees perform such procedures as are, in his/her professional opinion, necessary and desirable. 4.  Any tissue or organs removed in the operation or other procedure may be disposed of by and at the discretion of the [address] [place] [address] and [facility]. 5.  I understand that in the event of a medical emergency, I will be transported by local paramedics to [facility] or other hospital emergency department. 6.  I certify that I have read and fully understand the above consent to operation and/or other procedure. 7.  I acknowledge that my physician has explained sedation/analgesia administration to me including the risks and benefits. I consent to the administration of sedation/analgesia as may be necessary or desirable in the judgement of my physician. Witness signature: ___________________________________________________ Date:  ______/______/_____                    Time:  ________ A. M.  P.M.        Patient Name: ______________________________________________________  (please print)      Patient signature:  ___________________________________________________             Relationship to Patient:           []  Parent    Responsible person                          []  Spouse  In case of minor or                    [] Other  _____________   Incompetent name:  __________________________________________________                               (please print)      _____________      Responsible person  In case of minor or  Incompetent signature:  _______________________________________________    Statement of Physician  My signature below affirms that prior to the time of the procedure, I have explained to the patient and/or his/her guardian, the risks and benefits involved in the proposed treatment and any reasonable alternative to the proposed treatment. I have also explained the risks and benefits involved in the refusal of the proposed treatment and have answered the patient's questions.                         Date:  ______/______/_______  Provider                      Signature:  __________________________________________________________       Time:  ___________ A.M    P.M.

## (undated) NOTE — LETTER
[address]      Authorization for Surgical Operation and Procedure     Date:__03/02/2021_________                                                                                                         Ti 4.   Should the need arise during my operation or immediate post-operative period, I also consent to the administration of blood and/or blood products.   Further, I understand that despite careful testing and screening of blood or blood products by [facility].   I recognize that in the event my procedure results in extended X-Ray/fluoroscopy time, I may develop a skin reaction. 9.  If I have a Do Not Attempt Resuscitation (DNAR) order in place, that status will be suspended while in the operating room, proc STATEMENT OF PHYSICIAN My signature below affirms that prior to the time of the procedure; I have explained to the patient and/or his/her legal representative, the risks and benefits involved in the proposed treatment and any reasonable alternative to the

## (undated) NOTE — LETTER
12/23/2024          To Whom It May Concern:    Eric Blount is currently under my medical care and her fiance missed work due to her being in need of my care  Please excuse him for 1 days.  He may return to work on next day.  Activity is restricted as follows: none.    If you require additional information please contact our office.        Sincerely,    NATE Severino            Document generated by:  NATE Severino

## (undated) NOTE — LETTER
Date & Time: 9/7/2022, 8:36 PM  Patient: Ketan Blount  Encounter Provider(s):    NATE Selby       To Whom It May Concern: Tommy Kidd was seen and treated in our department on 9/7/2022. She can turn to work if covid negative on 9/12.     If you have any questions or concerns, please do not hesitate to call.        _____________________________  Physician/APC Signature

## (undated) NOTE — LETTER
7/24/2023              Cee Blount        [address]        [place] [address]         To Whom It May Concern,  The above named patient is currently under our care for pregnancy. Pt may have routine dentistry performed. If x-ray is needed the patient must be shielded with a lead apron covering the abdominal area. Local anesthesia is safe if using Lidocaine with or without Epinephrine. Once allergies have been assessed antibiotics such as Penicillin, Cephalosporin, and Clindamycin may be used. Quinolones are never recommended. When narcotics are needed, Tylenol #3 and Vicodin may be used for dental pain. ASA and NSAID's are not recommended during pregnancy. If you have further questions, please contact our office.       Sincerely,    Jolene Chavis CNM  [facility], [facility], [facility] [address]  [phone]

## (undated) NOTE — LETTER
VACCINE ADMINISTRATION RECORD  PARENT / GUARDIAN APPROVAL  Date: 2021  Vaccine administered to:  Mack Nichole     : 10/22/1995    MRN: [id number]    A copy of the appropriate Centers for Disease Control and Prevention Vaccine Information st

## (undated) NOTE — LETTER
VACCINE ADMINISTRATION RECORD  PARENT / GUARDIAN APPROVAL  Date: 10/2/2023  Vaccine administered to: Nawaf Merrill     : 10/22/1995    MRN: [id number]    A copy of the appropriate Centers for Disease Control and Prevention Vaccine Information statement has been provided. I have read or have had explained the information about the diseases and the vaccines listed below. There was an opportunity to ask questions and any questions were answered satisfactorily. I believe that I understand the benefits and risks of the vaccine cited and ask that the vaccine(s) listed below be given to me or to the person named above (for whom I am authorized to make this request). VACCINES ADMINISTERED:  Tdap    I have read and hereby agree to be bound by the terms of this agreement as stated above. My signature is valid until revoked by me in writing. This document is signed by Rozina Herrera, relationship: Self on 10/2/2023.:                                                                                                                                         Parent / Guardian Signature                                                Date    Clari Savage served as a witness to authentication that the identity of the person signing electronically is in fact the person represented as signing. This document was generated by Clari Savage on 10/2/2023.

## (undated) NOTE — LETTER
[facility]  Administration of Anesthesia  1.  Iman Sen, or _________________________________ acting on her behalf, (Patient) (Dependent/Representative) request to receive anesthesia for my pending procedure/operation/treatm 6. OBSTETRIC PATIENTS: Specific risks/consequences of spinal/epidural anesthesia may include itching, low blood pressure, difficulty urinating, slowing of the baby's heart rate and headache.  Rare risks include infections, high spinal block, spinal bleeding ___________________________________________________           _____________________________________________________  Date/Time                                                                                               Responsible person in case of minor